# Patient Record
Sex: FEMALE | Race: WHITE
[De-identification: names, ages, dates, MRNs, and addresses within clinical notes are randomized per-mention and may not be internally consistent; named-entity substitution may affect disease eponyms.]

---

## 2021-10-13 ENCOUNTER — HOSPITAL ENCOUNTER (OUTPATIENT)
Dept: HOSPITAL 79 - EMI | Age: 66
End: 2021-10-13
Attending: NURSE PRACTITIONER
Payer: COMMERCIAL

## 2021-10-13 DIAGNOSIS — I67.9: ICD-10-CM

## 2021-10-13 DIAGNOSIS — R50.9: ICD-10-CM

## 2021-10-13 DIAGNOSIS — G43.009: Primary | ICD-10-CM

## 2023-05-09 ENCOUNTER — TELEPHONE (OUTPATIENT)
Dept: FAMILY MEDICINE CLINIC | Facility: CLINIC | Age: 68
End: 2023-05-09
Payer: MEDICARE

## 2023-05-09 NOTE — TELEPHONE ENCOUNTER
Attempted to contact patient to reschedule previous missed appointment on 5/9/23    HUB okay to reschedule appointment at patients earliest convenience.    Cookie Alicea, [employer] Rep

## 2023-05-11 ENCOUNTER — TELEPHONE (OUTPATIENT)
Dept: FAMILY MEDICINE CLINIC | Facility: CLINIC | Age: 68
End: 2023-05-11
Payer: MEDICARE

## 2023-05-11 NOTE — TELEPHONE ENCOUNTER
Attempted to contact patient to reschedule previous missed appointment on 5/11/23    HUB okay to reschedule appointment at patients earliest convenience.    Cookie Alicea, [employer] Rep

## 2023-05-17 ENCOUNTER — OFFICE VISIT (OUTPATIENT)
Dept: FAMILY MEDICINE CLINIC | Facility: CLINIC | Age: 68
End: 2023-05-17
Payer: MEDICARE

## 2023-05-17 VITALS
OXYGEN SATURATION: 96 % | TEMPERATURE: 97.1 F | BODY MASS INDEX: 20.76 KG/M2 | WEIGHT: 112.8 LBS | HEART RATE: 86 BPM | DIASTOLIC BLOOD PRESSURE: 64 MMHG | SYSTOLIC BLOOD PRESSURE: 122 MMHG | RESPIRATION RATE: 18 BRPM | HEIGHT: 62 IN

## 2023-05-17 DIAGNOSIS — Z23 NEED FOR TDAP VACCINATION: ICD-10-CM

## 2023-05-17 DIAGNOSIS — E11.65 TYPE 2 DIABETES MELLITUS WITH HYPERGLYCEMIA, WITHOUT LONG-TERM CURRENT USE OF INSULIN: ICD-10-CM

## 2023-05-17 DIAGNOSIS — E78.49 OTHER HYPERLIPIDEMIA: ICD-10-CM

## 2023-05-17 DIAGNOSIS — M81.0 AGE-RELATED OSTEOPOROSIS WITHOUT CURRENT PATHOLOGICAL FRACTURE: ICD-10-CM

## 2023-05-17 DIAGNOSIS — Z00.00 ANNUAL PHYSICAL EXAM: ICD-10-CM

## 2023-05-17 DIAGNOSIS — Z00.00 ENCOUNTER FOR MEDICAL EXAMINATION TO ESTABLISH CARE: Primary | ICD-10-CM

## 2023-05-17 DIAGNOSIS — Z87.891 PERSONAL HISTORY OF NICOTINE DEPENDENCE: ICD-10-CM

## 2023-05-17 DIAGNOSIS — J43.8 OTHER EMPHYSEMA: ICD-10-CM

## 2023-05-17 DIAGNOSIS — Z13.820 SCREENING FOR OSTEOPOROSIS: ICD-10-CM

## 2023-05-17 DIAGNOSIS — Z12.2 ENCOUNTER FOR SCREENING FOR LUNG CANCER: ICD-10-CM

## 2023-05-17 DIAGNOSIS — Z12.2 SCREENING FOR LUNG CANCER: ICD-10-CM

## 2023-05-17 DIAGNOSIS — F33.41 RECURRENT MAJOR DEPRESSIVE DISORDER, IN PARTIAL REMISSION: ICD-10-CM

## 2023-05-17 PROBLEM — I67.9 CEREBROVASCULAR SMALL VESSEL DISEASE: Status: ACTIVE | Noted: 2021-09-30

## 2023-05-17 PROBLEM — J30.89 ENVIRONMENTAL AND SEASONAL ALLERGIES: Status: ACTIVE | Noted: 2023-05-17

## 2023-05-17 PROBLEM — G43.009 MIGRAINE WITHOUT AURA AND WITHOUT STATUS MIGRAINOSUS, NOT INTRACTABLE: Status: ACTIVE | Noted: 2023-03-21

## 2023-05-17 PROBLEM — K21.9 GASTROESOPHAGEAL REFLUX DISEASE WITHOUT ESOPHAGITIS: Status: ACTIVE | Noted: 2023-05-17

## 2023-05-17 PROBLEM — M05.79 RHEUMATOID ARTHRITIS INVOLVING MULTIPLE SITES WITH POSITIVE RHEUMATOID FACTOR: Status: ACTIVE | Noted: 2023-05-17

## 2023-05-17 PROBLEM — G44.89 CHRONIC MIXED HEADACHE SYNDROME: Status: ACTIVE | Noted: 2021-09-30

## 2023-05-17 PROBLEM — F33.9 RECURRENT MAJOR DEPRESSIVE DISORDER: Status: ACTIVE | Noted: 2023-05-17

## 2023-05-17 LAB
ALBUMIN SERPL-MCNC: 4.1 G/DL (ref 3.5–5.2)
ALBUMIN/GLOB SERPL: 1.2 G/DL
ALP SERPL-CCNC: 121 U/L (ref 39–117)
ALT SERPL W P-5'-P-CCNC: 9 U/L (ref 1–33)
ANION GAP SERPL CALCULATED.3IONS-SCNC: 10.9 MMOL/L (ref 5–15)
AST SERPL-CCNC: 12 U/L (ref 1–32)
BASOPHILS # BLD AUTO: 0.13 10*3/MM3 (ref 0–0.2)
BASOPHILS NFR BLD AUTO: 1.1 % (ref 0–1.5)
BILIRUB BLD-MCNC: NEGATIVE MG/DL
BILIRUB SERPL-MCNC: 0.2 MG/DL (ref 0–1.2)
BUN SERPL-MCNC: 14 MG/DL (ref 8–23)
BUN/CREAT SERPL: 18.7 (ref 7–25)
CALCIUM SPEC-SCNC: 9.9 MG/DL (ref 8.6–10.5)
CHLORIDE SERPL-SCNC: 103 MMOL/L (ref 98–107)
CHOLEST SERPL-MCNC: 154 MG/DL (ref 0–200)
CLARITY, POC: CLEAR
CO2 SERPL-SCNC: 24.1 MMOL/L (ref 22–29)
COLOR UR: YELLOW
CREAT SERPL-MCNC: 0.75 MG/DL (ref 0.57–1)
DEPRECATED RDW RBC AUTO: 48.8 FL (ref 37–54)
EGFRCR SERPLBLD CKD-EPI 2021: 87.9 ML/MIN/1.73
EOSINOPHIL # BLD AUTO: 0.23 10*3/MM3 (ref 0–0.4)
EOSINOPHIL NFR BLD AUTO: 1.9 % (ref 0.3–6.2)
ERYTHROCYTE [DISTWIDTH] IN BLOOD BY AUTOMATED COUNT: 13.4 % (ref 12.3–15.4)
GLOBULIN UR ELPH-MCNC: 3.4 GM/DL
GLUCOSE SERPL-MCNC: 98 MG/DL (ref 65–99)
GLUCOSE UR STRIP-MCNC: NEGATIVE MG/DL
HBA1C MFR BLD: 5.9 % (ref 4.8–5.6)
HCT VFR BLD AUTO: 44.1 % (ref 34–46.6)
HCV AB SER DONR QL: NORMAL
HDLC SERPL-MCNC: 47 MG/DL (ref 40–60)
HGB BLD-MCNC: 14 G/DL (ref 12–15.9)
IMM GRANULOCYTES # BLD AUTO: 0.19 10*3/MM3 (ref 0–0.05)
IMM GRANULOCYTES NFR BLD AUTO: 1.6 % (ref 0–0.5)
KETONES UR QL: NEGATIVE
LDLC SERPL CALC-MCNC: 90 MG/DL (ref 0–100)
LDLC/HDLC SERPL: 1.88 {RATIO}
LEUKOCYTE EST, POC: NEGATIVE
LYMPHOCYTES # BLD AUTO: 1.81 10*3/MM3 (ref 0.7–3.1)
LYMPHOCYTES NFR BLD AUTO: 14.9 % (ref 19.6–45.3)
MCH RBC QN AUTO: 31.3 PG (ref 26.6–33)
MCHC RBC AUTO-ENTMCNC: 31.7 G/DL (ref 31.5–35.7)
MCV RBC AUTO: 98.7 FL (ref 79–97)
MONOCYTES # BLD AUTO: 1.22 10*3/MM3 (ref 0.1–0.9)
MONOCYTES NFR BLD AUTO: 10 % (ref 5–12)
NEUTROPHILS NFR BLD AUTO: 70.5 % (ref 42.7–76)
NEUTROPHILS NFR BLD AUTO: 8.57 10*3/MM3 (ref 1.7–7)
NITRITE UR-MCNC: NEGATIVE MG/ML
NRBC BLD AUTO-RTO: 0 /100 WBC (ref 0–0.2)
PH UR: 6 [PH] (ref 5–8)
PLATELET # BLD AUTO: 388 10*3/MM3 (ref 140–450)
PMV BLD AUTO: 10.8 FL (ref 6–12)
POTASSIUM SERPL-SCNC: 3.6 MMOL/L (ref 3.5–5.2)
PROT SERPL-MCNC: 7.5 G/DL (ref 6–8.5)
PROT UR STRIP-MCNC: NEGATIVE MG/DL
RBC # BLD AUTO: 4.47 10*6/MM3 (ref 3.77–5.28)
RBC # UR STRIP: NEGATIVE /UL
SODIUM SERPL-SCNC: 138 MMOL/L (ref 136–145)
SP GR UR: 1.01 (ref 1–1.03)
TRIGL SERPL-MCNC: 93 MG/DL (ref 0–150)
TSH SERPL DL<=0.05 MIU/L-ACNC: 0.43 UIU/ML (ref 0.27–4.2)
UROBILINOGEN UR QL: NORMAL
VLDLC SERPL-MCNC: 17 MG/DL (ref 5–40)
WBC NRBC COR # BLD: 12.15 10*3/MM3 (ref 3.4–10.8)

## 2023-05-17 PROCEDURE — 85025 COMPLETE CBC W/AUTO DIFF WBC: CPT | Performed by: PSYCHOLOGIST

## 2023-05-17 PROCEDURE — 80061 LIPID PANEL: CPT | Performed by: PSYCHOLOGIST

## 2023-05-17 PROCEDURE — 84443 ASSAY THYROID STIM HORMONE: CPT | Performed by: PSYCHOLOGIST

## 2023-05-17 PROCEDURE — 86803 HEPATITIS C AB TEST: CPT | Performed by: PSYCHOLOGIST

## 2023-05-17 PROCEDURE — 83036 HEMOGLOBIN GLYCOSYLATED A1C: CPT | Performed by: PSYCHOLOGIST

## 2023-05-17 PROCEDURE — 82306 VITAMIN D 25 HYDROXY: CPT | Performed by: PSYCHOLOGIST

## 2023-05-17 PROCEDURE — 82043 UR ALBUMIN QUANTITATIVE: CPT | Performed by: PSYCHOLOGIST

## 2023-05-17 PROCEDURE — 82607 VITAMIN B-12: CPT | Performed by: PSYCHOLOGIST

## 2023-05-17 PROCEDURE — 80053 COMPREHEN METABOLIC PANEL: CPT | Performed by: PSYCHOLOGIST

## 2023-05-17 RX ORDER — FLUTICASONE PROPIONATE 50 MCG
2 SPRAY, SUSPENSION (ML) NASAL DAILY
COMMUNITY

## 2023-05-17 RX ORDER — MULTIPLE VITAMINS W/ MINERALS TAB 9MG-400MCG
1 TAB ORAL DAILY
COMMUNITY

## 2023-05-17 RX ORDER — HYDROXYCHLOROQUINE SULFATE 200 MG/1
1 TABLET, FILM COATED ORAL DAILY
COMMUNITY
Start: 2023-05-03

## 2023-05-17 RX ORDER — RAMIPRIL 2.5 MG/1
2.5 CAPSULE ORAL DAILY
COMMUNITY
Start: 2023-05-03

## 2023-05-17 RX ORDER — BUTYROSPERMUM PARKII(SHEA BUTTER), SIMMONDSIA CHINENSIS (JOJOBA) SEED OIL, ALOE BARBADENSIS LEAF EXTRACT .01; 1; 3.5 G/100G; G/100G; G/100G
LIQUID TOPICAL
COMMUNITY

## 2023-05-17 RX ORDER — EAR PLUGS
EACH OTIC (EAR)
COMMUNITY

## 2023-05-17 RX ORDER — FAMOTIDINE 20 MG/1
1 TABLET, FILM COATED ORAL DAILY
COMMUNITY
Start: 2023-05-03

## 2023-05-17 RX ORDER — ACETAMINOPHEN 160 MG
2000 TABLET,DISINTEGRATING ORAL DAILY
COMMUNITY

## 2023-05-17 RX ORDER — SITAGLIPTIN 25 MG/1
TABLET, FILM COATED ORAL
COMMUNITY
Start: 2023-05-03

## 2023-05-17 RX ORDER — OMEPRAZOLE 20 MG/1
20 CAPSULE, DELAYED RELEASE ORAL 2 TIMES DAILY
COMMUNITY
Start: 2023-05-03

## 2023-05-17 RX ORDER — METOPROLOL SUCCINATE 25 MG/1
1 TABLET, EXTENDED RELEASE ORAL DAILY
COMMUNITY
Start: 2023-05-03

## 2023-05-17 RX ORDER — ALBUTEROL SULFATE 90 UG/1
AEROSOL, METERED RESPIRATORY (INHALATION)
COMMUNITY
Start: 2023-02-06

## 2023-05-17 RX ORDER — VENLAFAXINE HYDROCHLORIDE 150 MG/1
CAPSULE, EXTENDED RELEASE ORAL
COMMUNITY
Start: 2023-05-03

## 2023-05-17 RX ORDER — OXCARBAZEPINE 150 MG/1
1 TABLET, FILM COATED ORAL EVERY 12 HOURS SCHEDULED
COMMUNITY
Start: 2023-05-03

## 2023-05-17 RX ORDER — TOPIRAMATE 50 MG/1
TABLET, FILM COATED ORAL
COMMUNITY
Start: 2023-05-03

## 2023-05-17 RX ORDER — PRAVASTATIN SODIUM 20 MG
TABLET ORAL
COMMUNITY
Start: 2023-05-03

## 2023-05-17 RX ORDER — CLONIDINE HYDROCHLORIDE 0.1 MG/1
0.1 TABLET ORAL 2 TIMES DAILY
COMMUNITY

## 2023-05-17 RX ORDER — FUROSEMIDE 20 MG/1
1 TABLET ORAL DAILY
COMMUNITY
Start: 2023-05-03

## 2023-05-17 RX ORDER — TICAGRELOR 90 MG/1
1 TABLET ORAL EVERY 12 HOURS SCHEDULED
COMMUNITY
Start: 2023-05-03

## 2023-05-17 RX ORDER — ISOSORBIDE DINITRATE 10 MG/1
1 TABLET ORAL EVERY 12 HOURS SCHEDULED
COMMUNITY
Start: 2023-05-03

## 2023-05-17 RX ORDER — MONTELUKAST SODIUM 10 MG/1
1 TABLET ORAL DAILY
COMMUNITY
Start: 2023-05-03

## 2023-05-17 NOTE — PROGRESS NOTES
Chief Complaint  Establish Care and Migraine    Subjective        Radha Stanton presents to [facility]   C/O ESTABLISH CARE AS HER PCP 2. ANNUAL PHYSICAL 3. CHRONIC ILLNESS  4. FASTING LABS [address]79  Hyperlipidemia  This is a chronic problem. The current episode started more than 1 year ago. Factors aggravating her hyperlipidemia include smoking. Pertinent negatives include no chest pain or shortness of breath. Current antihyperlipidemic treatment includes statins. There are no compliance problems.  Risk factors for coronary artery disease include hypertension, diabetes mellitus and post-menopausal.   COPD  There is no chest tightness, difficulty breathing, shortness of breath or wheezing. This is a chronic problem. The problem has been waxing and waning. Pertinent negatives include no chest pain. Her symptoms are aggravated by change in weather and pollen. Her symptoms are alleviated by ipratropium and beta-agonist. She reports moderate improvement on treatment. Risk factors for lung disease include smoking/tobacco exposure. Her past medical history is significant for COPD and emphysema.   Diabetes  She presents for her initial diabetic visit. She has type 2 diabetes mellitus. No MedicAlert identification noted. Pertinent negatives for diabetes include no chest pain. Risk factors for coronary artery disease include hypertension and dyslipidemia. Current diabetic treatment includes diet and oral agent (monotherapy). She is compliant with treatment all of the time. Her weight is stable. She is following a generally healthy diet. She has not had a previous visit with a dietitian. She participates in exercise daily. An ACE inhibitor/angiotensin II receptor blocker is being taken. She does not see a podiatrist.Eye exam is not current.       Objective   Vital Signs:  /64 (BP Location: Left arm, Patient Position: Sitting, Cuff Size: Adult)   Pulse 86   Temp 97.1 °F (36.2 °C) (Temporal)   " Resp 18   Ht 157.5 cm (62\")   Wt 51.2 kg (112 lb 12.8 oz)   SpO2 96%   BMI 20.63 kg/m²   Estimated body mass index is 20.63 kg/m² as calculated from the following:    Height as of this encounter: 157.5 cm (62\").    Weight as of this encounter: 51.2 kg (112 lb 12.8 oz).    BMI is within normal parameters. No other follow-up for BMI required.      Physical Exam  Vitals and nursing note reviewed. Exam conducted with a chaperone present.   Constitutional:       General: She is awake.      Appearance: Normal appearance. She is well-developed, well-groomed and normal weight.   HENT:      Head: Normocephalic and atraumatic.      Jaw: There is normal jaw occlusion.      Right Ear: Hearing, tympanic membrane, ear canal and external ear normal.      Left Ear: Hearing, tympanic membrane, ear canal and external ear normal.      Nose: Nose normal.      Mouth/Throat:      Lips: Pink.      Mouth: Mucous membranes are moist.      Pharynx: Oropharynx is clear.   Eyes:      General: Lids are normal. Vision grossly intact. Gaze aligned appropriately.      Extraocular Movements: Extraocular movements intact.      Conjunctiva/sclera: Conjunctivae normal.      Pupils: Pupils are equal, round, and reactive to light.   Neck:      Trachea: Trachea and phonation normal.   Cardiovascular:      Rate and Rhythm: Normal rate and regular rhythm.      Pulses: Normal pulses.      Heart sounds: Normal heart sounds.   Pulmonary:      Effort: Pulmonary effort is normal.      Breath sounds: Normal breath sounds and air entry.   Abdominal:      General: Abdomen is flat. Bowel sounds are normal.      Palpations: Abdomen is soft.   Genitourinary:     Rectum: Normal.   Musculoskeletal:         General: Normal range of motion.      Right shoulder: Normal.      Left shoulder: Normal.      Right upper arm: Normal.      Left upper arm: Normal.      Right elbow: Normal.      Left elbow: Normal.      Right forearm: Normal.      Left forearm: Normal.      " Right wrist: Normal.      Left wrist: Normal.      Right hand: Normal.      Left hand: Normal.      Cervical back: Normal, full passive range of motion without pain, normal range of motion and neck supple.      Thoracic back: Normal.      Lumbar back: Normal.      Right hip: Normal.      Left hip: Normal.      Right upper leg: Normal.      Left upper leg: Normal.      Right knee: Normal.      Left knee: Normal.      Right lower leg: Normal. No edema.      Left lower leg: Normal. No edema.      Right ankle: Normal.      Right Achilles Tendon: Normal.      Left ankle: Normal.      Left Achilles Tendon: Normal.      Right foot: Normal.      Left foot: Normal.   Lymphadenopathy:      Cervical: No cervical adenopathy.   Skin:     General: Skin is warm.      Capillary Refill: Capillary refill takes less than 2 seconds.   Neurological:      General: No focal deficit present.      Mental Status: She is alert and oriented to person, place, and time.      Cranial Nerves: Cranial nerves 2-12 are intact.      Sensory: Sensation is intact.      Motor: Motor function is intact.      Coordination: Coordination is intact.      Gait: Gait is intact.      Deep Tendon Reflexes: Reflexes are normal and symmetric.   Psychiatric:         Attention and Perception: Attention and perception normal.         Mood and Affect: Mood and affect normal.         Speech: Speech normal.         Behavior: Behavior normal. Behavior is cooperative.         Thought Content: Thought content normal.         Cognition and Memory: Cognition and memory normal.         Judgment: Judgment normal.        Result Review :  The following data was reviewed by: Johnathan Carey MD on 05/17/2023:  LABS STILL PENDING . WILL REVIEW FROM PREVIOUS PCP           Assessment and Plan   Diagnoses and all orders for this visit:    1. Encounter for medical examination to establish care (Primary)    2. Annual physical exam  -     CBC & Differential  -     Comprehensive  Metabolic Panel  -     Hemoglobin A1c  -     Lipid Panel  -     TSH  -     Vitamin B12  -     Vitamin D,25-Hydroxy  -     POC Urinalysis Dipstick; Future  -     Hepatitis C Antibody    3. Need for Tdap vaccination    4. Screening for lung cancer    5. Encounter for screening for lung cancer  -     CT Chest Low Dose Wo; Future    6. Other hyperlipidemia  Assessment & Plan:  Lipid abnormalities are improving with treatment.  Nutritional counseling was provided. and Pharmacotherapy as ordered.  Lipids will be reassessed in 3 months.    Orders:  -     CBC & Differential  -     Comprehensive Metabolic Panel  -     Hemoglobin A1c  -     Lipid Panel  -     TSH  -     Vitamin B12  -     Vitamin D,25-Hydroxy  -     POC Urinalysis Dipstick; Future  -     Hepatitis C Antibody    7. Type 2 diabetes mellitus with hyperglycemia, without long-term current use of insulin  Assessment & Plan:  Diabetes is newly identified.   Continue current treatment regimen.  Regular aerobic exercise.  Discussed sick day management.  Discussed foot care.  Reminded to get yearly retinal exam.  Diabetes will be reassessed in 3 months.    NEW PATIENT AS HER PCP -- WILL CK A1C- NOT SURE HOW CONTROLLED HER DM IS     Orders:  -     CBC & Differential  -     Comprehensive Metabolic Panel  -     Hemoglobin A1c  -     Lipid Panel  -     TSH  -     Vitamin B12  -     Vitamin D,25-Hydroxy  -     POC Urinalysis Dipstick; Future  -     Hepatitis C Antibody  -     MicroAlbumin, Urine, Random - Urine, Clean Catch    8. Recurrent major depressive disorder, in partial remission  Assessment & Plan:  Patient's depression is recurrent and is mild without psychosis. Their depression is currently in partial remission and the condition is improving with treatment. This will be reassessed in 3 months. F/U as described:patient will continue current medication therapy.  SHE IS UNDER THE CARE OF AARON    Orders:  -     CBC & Differential  -     Comprehensive Metabolic  Panel  -     Hemoglobin A1c  -     Lipid Panel  -     TSH  -     Vitamin B12  -     Vitamin D,25-Hydroxy  -     POC Urinalysis Dipstick; Future  -     Hepatitis C Antibody    9. Other emphysema  Assessment & Plan:  COPD is worsening.  Warning signs of respiratory distress were reviewed with the patient.   Continue current medications.    SHE IS UNDER DR ROSARIO'S CARE AND RECENTLY DX WITH PNEUMONIA       Orders:  -     CBC & Differential  -     Comprehensive Metabolic Panel  -     Hemoglobin A1c  -     Lipid Panel  -     TSH  -     Vitamin B12  -     Vitamin D,25-Hydroxy  -     POC Urinalysis Dipstick; Future  -     Hepatitis C Antibody    10. Screening for osteoporosis  -     DEXA Bone Density Axial; Future    11. Age-related osteoporosis without current pathological fracture  -     DEXA Bone Density Axial; Future  -     Vitamin D,25-Hydroxy    12. Personal history of nicotine dependence  -     CT Chest Low Dose Wo; Future    Other orders  -     Tdap Vaccine Greater Than or Equal To 6yo IM    I spent 35 minutes caring for Radha on this date of service. This time includes time spent by me in the following activities:reviewing tests, performing a medically appropriate examination and/or evaluation , counseling and educating the patient/family/caregiver, ordering medications, tests, or procedures and documenting information in the medical record     The preventive exam has been reviewed in detail.  The patient has been fully counseled on preventative guidelines for vaccines, cancer screenings, and other health maintenance needs.   The patient has been counseled on guidelines for maintaining a lifestyle to promote good health and to minimize chronic diseases.  The patient has been assisted with scheduling these healthcare procedures for the coming year and given a written document of health maintenance and anticipatory guidance for age with the AVS.   -  Follow Up   Return in about 3 months (around 8/17/2023) for Recheck,  RTC FASTING LABS ( CHRONIC ILLNES/ MED REFILL) .  Patient was given instructions and counseling regarding her condition or for health maintenance advice. Please see specific information pulled into the AVS if appropriate.           This document has been electronically signed by Johnathan Carey MD  May 18, 2023 08:48 EDT

## 2023-05-17 NOTE — ASSESSMENT & PLAN NOTE
COPD is worsening.  Warning signs of respiratory distress were reviewed with the patient.   Continue current medications.    SHE IS UNDER DR ROSARIO'S CARE AND RECENTLY DX WITH PNEUMONIA

## 2023-05-17 NOTE — ASSESSMENT & PLAN NOTE
Diabetes is newly identified.   Continue current treatment regimen.  Regular aerobic exercise.  Discussed sick day management.  Discussed foot care.  Reminded to get yearly retinal exam.  Diabetes will be reassessed in 3 months.    NEW PATIENT AS HER PCP -- WILL CK A1C- NOT SURE HOW CONTROLLED HER DM IS

## 2023-05-17 NOTE — ASSESSMENT & PLAN NOTE
Patient's depression is recurrent and is mild without psychosis. Their depression is currently in partial remission and the condition is improving with treatment. This will be reassessed in 3 months. F/U as described:patient will continue current medication therapy.  SHE IS UNDER THE CARE OF AARON

## 2023-05-18 LAB
25(OH)D3 SERPL-MCNC: 45.3 NG/ML (ref 30–100)
ALBUMIN UR-MCNC: <1.2 MG/DL
VIT B12 BLD-MCNC: 1206 PG/ML (ref 211–946)

## 2023-05-19 ENCOUNTER — PATIENT ROUNDING (BHMG ONLY) (OUTPATIENT)
Dept: FAMILY MEDICINE CLINIC | Facility: CLINIC | Age: 68
End: 2023-05-19
Payer: MEDICARE

## 2023-05-19 NOTE — PROGRESS NOTES
"May 19, 2023    Hello, may I speak with Radha Stanton?    My name is Eliezer Forrest     I am  with [facility]  [address] [address] [phone].    Before we get started may I verify your date of birth? 1955    I am calling to officially welcome you to our practice and ask about your recent visit. Is this a good time to talk?     YES    Tell me about your visit with us. What things went well?      Patient said that she was referred to our office by some family members. She said she has been looking for a \"real\" doctor and she feels that is what Dr. Carey is. She said the staff was very nice and she loves her new doctor (Dr. Mcpherson).       We're always looking for ways to make our patients' experiences even better. Do you have recommendations on ways we may improve?      NO    Overall were you satisfied with your first visit to our practice?     YES     I appreciate you taking the time to speak with me today. Is there anything else I can do for you?     NO      Thank you, and have a great day.      " None

## 2023-05-22 ENCOUNTER — TELEPHONE (OUTPATIENT)
Dept: FAMILY MEDICINE CLINIC | Facility: CLINIC | Age: 68
End: 2023-05-22
Payer: MEDICARE

## 2023-05-22 NOTE — TELEPHONE ENCOUNTER
Caller: Radha Stanton    Relationship to patient: Self    Best call back number: [phone]    Chief complaint: FOLLOWUP    Type of visit: FOLLOWUP     Additional notes:  PATIENT SAW DR SNELL ON 5/17/23 AND WANTED TO KNOW WHEN SHE NEEDS TO SEE HER BACK.  PLEASE ADVISE.

## 2023-05-23 NOTE — TELEPHONE ENCOUNTER
Attempted to reach patient to schedule follow up for -      Return in about 3 months (around 8/17/2023) for Recheck, RTC FASTING LABS ( CHRONIC ILLNES/ MED REFILL) .    HUB okay to schedule patient for on of after 8/17/23

## 2023-08-08 ENCOUNTER — TELEPHONE (OUTPATIENT)
Dept: FAMILY MEDICINE CLINIC | Facility: CLINIC | Age: 68
End: 2023-08-08
Payer: MEDICARE

## 2023-08-08 NOTE — TELEPHONE ENCOUNTER
Attempted to reach patient to schedule appointment on or after 8/17/2023 for an ov to Recheck, RTC FASTING LABS ( CHRONIC ILLNES/ MED REFILL) .   HUB okay to schedule

## 2023-08-09 ENCOUNTER — TELEPHONE (OUTPATIENT)
Dept: FAMILY MEDICINE CLINIC | Facility: CLINIC | Age: 68
End: 2023-08-09
Payer: MEDICARE

## 2023-08-09 NOTE — TELEPHONE ENCOUNTER
Called patient to advise it was time for her yearly mammogram.  Did patient answer? No  If yes, did patient wish to have mammogram scheduled at this time? N/A

## 2023-08-10 ENCOUNTER — TELEPHONE (OUTPATIENT)
Dept: FAMILY MEDICINE CLINIC | Facility: CLINIC | Age: 68
End: 2023-08-10
Payer: MEDICARE

## 2024-01-08 ENCOUNTER — OFFICE VISIT (OUTPATIENT)
Dept: FAMILY MEDICINE CLINIC | Facility: CLINIC | Age: 69
End: 2024-01-08
Payer: MEDICARE

## 2024-01-08 VITALS
DIASTOLIC BLOOD PRESSURE: 72 MMHG | TEMPERATURE: 97.6 F | HEART RATE: 88 BPM | HEIGHT: 62 IN | BODY MASS INDEX: 19.69 KG/M2 | SYSTOLIC BLOOD PRESSURE: 98 MMHG | WEIGHT: 107 LBS | RESPIRATION RATE: 18 BRPM | OXYGEN SATURATION: 99 %

## 2024-01-08 DIAGNOSIS — Z87.891 PERSONAL HISTORY OF NICOTINE DEPENDENCE: ICD-10-CM

## 2024-01-08 DIAGNOSIS — Z12.31 ENCOUNTER FOR SCREENING MAMMOGRAM FOR MALIGNANT NEOPLASM OF BREAST: ICD-10-CM

## 2024-01-08 DIAGNOSIS — M81.0 AGE-RELATED OSTEOPOROSIS WITHOUT CURRENT PATHOLOGICAL FRACTURE: ICD-10-CM

## 2024-01-08 DIAGNOSIS — J01.00 ACUTE MAXILLARY SINUSITIS, RECURRENCE NOT SPECIFIED: ICD-10-CM

## 2024-01-08 DIAGNOSIS — Z79.899 MEDICATION MANAGEMENT: ICD-10-CM

## 2024-01-08 DIAGNOSIS — Z00.00 ENCOUNTER FOR INITIAL ANNUAL WELLNESS VISIT (AWV) IN MEDICARE PATIENT: Primary | ICD-10-CM

## 2024-01-08 DIAGNOSIS — Z76.0 ENCOUNTER FOR MEDICATION REFILL: ICD-10-CM

## 2024-01-08 DIAGNOSIS — R91.8 MULTIPLE PULMONARY NODULES: ICD-10-CM

## 2024-01-08 PROBLEM — M54.81 BILATERAL OCCIPITAL NEURALGIA: Status: ACTIVE | Noted: 2020-03-10

## 2024-01-08 PROBLEM — G43.719 INTRACTABLE CHRONIC MIGRAINE WITHOUT AURA AND WITHOUT STATUS MIGRAINOSUS: Status: ACTIVE | Noted: 2020-03-10

## 2024-01-08 LAB
ALBUMIN SERPL-MCNC: 4 G/DL (ref 3.5–5.2)
ALBUMIN/GLOB SERPL: 1.4 G/DL
ALP SERPL-CCNC: 94 U/L (ref 39–117)
ALT SERPL W P-5'-P-CCNC: 11 U/L (ref 1–33)
ANION GAP SERPL CALCULATED.3IONS-SCNC: 8.3 MMOL/L (ref 5–15)
AST SERPL-CCNC: 14 U/L (ref 1–32)
BASOPHILS # BLD AUTO: 0.08 10*3/MM3 (ref 0–0.2)
BASOPHILS NFR BLD AUTO: 0.9 % (ref 0–1.5)
BILIRUB BLD-MCNC: NEGATIVE MG/DL
BILIRUB SERPL-MCNC: <0.2 MG/DL (ref 0–1.2)
BUN SERPL-MCNC: 12 MG/DL (ref 8–23)
BUN/CREAT SERPL: 14.5 (ref 7–25)
CALCIUM SPEC-SCNC: 10.1 MG/DL (ref 8.6–10.5)
CHLORIDE SERPL-SCNC: 105 MMOL/L (ref 98–107)
CHOLEST SERPL-MCNC: 175 MG/DL (ref 0–200)
CLARITY, POC: CLEAR
CO2 SERPL-SCNC: 28.7 MMOL/L (ref 22–29)
COLOR UR: YELLOW
CREAT SERPL-MCNC: 0.83 MG/DL (ref 0.57–1)
DEPRECATED RDW RBC AUTO: 55.3 FL (ref 37–54)
EGFRCR SERPLBLD CKD-EPI 2021: 77.9 ML/MIN/1.73
EOSINOPHIL # BLD AUTO: 0.14 10*3/MM3 (ref 0–0.4)
EOSINOPHIL NFR BLD AUTO: 1.5 % (ref 0.3–6.2)
ERYTHROCYTE [DISTWIDTH] IN BLOOD BY AUTOMATED COUNT: 14.8 % (ref 12.3–15.4)
GLOBULIN UR ELPH-MCNC: 2.8 GM/DL
GLUCOSE SERPL-MCNC: 102 MG/DL (ref 65–99)
GLUCOSE UR STRIP-MCNC: NEGATIVE MG/DL
HBA1C MFR BLD: 5.6 % (ref 4.8–5.6)
HCT VFR BLD AUTO: 39 % (ref 34–46.6)
HDLC SERPL-MCNC: 70 MG/DL (ref 40–60)
HGB BLD-MCNC: 12.5 G/DL (ref 12–15.9)
IMM GRANULOCYTES # BLD AUTO: 0.07 10*3/MM3 (ref 0–0.05)
IMM GRANULOCYTES NFR BLD AUTO: 0.7 % (ref 0–0.5)
KETONES UR QL: NEGATIVE
LDLC SERPL CALC-MCNC: 91 MG/DL (ref 0–100)
LDLC/HDLC SERPL: 1.28 {RATIO}
LEUKOCYTE EST, POC: NEGATIVE
LYMPHOCYTES # BLD AUTO: 1.74 10*3/MM3 (ref 0.7–3.1)
LYMPHOCYTES NFR BLD AUTO: 18.6 % (ref 19.6–45.3)
MCH RBC QN AUTO: 31.7 PG (ref 26.6–33)
MCHC RBC AUTO-ENTMCNC: 32.1 G/DL (ref 31.5–35.7)
MCV RBC AUTO: 99 FL (ref 79–97)
MONOCYTES # BLD AUTO: 0.98 10*3/MM3 (ref 0.1–0.9)
MONOCYTES NFR BLD AUTO: 10.5 % (ref 5–12)
NEUTROPHILS NFR BLD AUTO: 6.33 10*3/MM3 (ref 1.7–7)
NEUTROPHILS NFR BLD AUTO: 67.8 % (ref 42.7–76)
NITRITE UR-MCNC: NEGATIVE MG/ML
NRBC BLD AUTO-RTO: 0 /100 WBC (ref 0–0.2)
PH UR: 7 [PH] (ref 5–8)
PLATELET # BLD AUTO: 259 10*3/MM3 (ref 140–450)
PMV BLD AUTO: 11 FL (ref 6–12)
POTASSIUM SERPL-SCNC: 4.7 MMOL/L (ref 3.5–5.2)
PROT SERPL-MCNC: 6.8 G/DL (ref 6–8.5)
PROT UR STRIP-MCNC: NEGATIVE MG/DL
RBC # BLD AUTO: 3.94 10*6/MM3 (ref 3.77–5.28)
RBC # UR STRIP: NEGATIVE /UL
SODIUM SERPL-SCNC: 142 MMOL/L (ref 136–145)
SP GR UR: 1.01 (ref 1–1.03)
TRIGL SERPL-MCNC: 78 MG/DL (ref 0–150)
UROBILINOGEN UR QL: NORMAL
VLDLC SERPL-MCNC: 14 MG/DL (ref 5–40)
WBC NRBC COR # BLD AUTO: 9.34 10*3/MM3 (ref 3.4–10.8)

## 2024-01-08 PROCEDURE — 82607 VITAMIN B-12: CPT | Performed by: PSYCHOLOGIST

## 2024-01-08 PROCEDURE — 85025 COMPLETE CBC W/AUTO DIFF WBC: CPT | Performed by: PSYCHOLOGIST

## 2024-01-08 PROCEDURE — 80061 LIPID PANEL: CPT | Performed by: PSYCHOLOGIST

## 2024-01-08 PROCEDURE — 80053 COMPREHEN METABOLIC PANEL: CPT | Performed by: PSYCHOLOGIST

## 2024-01-08 PROCEDURE — 83036 HEMOGLOBIN GLYCOSYLATED A1C: CPT | Performed by: PSYCHOLOGIST

## 2024-01-08 PROCEDURE — 82306 VITAMIN D 25 HYDROXY: CPT | Performed by: PSYCHOLOGIST

## 2024-01-08 RX ORDER — ESOMEPRAZOLE MAGNESIUM 40 MG/1
40 CAPSULE, DELAYED RELEASE ORAL
COMMUNITY

## 2024-01-08 RX ORDER — LORAZEPAM 0.5 MG/1
1 TABLET ORAL 2 TIMES DAILY PRN
COMMUNITY
Start: 2023-11-29 | End: 2024-01-08

## 2024-01-08 RX ORDER — DICYCLOMINE HCL 20 MG
20 TABLET ORAL 2 TIMES DAILY
COMMUNITY

## 2024-01-08 RX ORDER — FLUTICASONE PROPIONATE 50 MCG
2 SPRAY, SUSPENSION (ML) NASAL DAILY
Qty: 18.2 ML | Refills: 0 | Status: SHIPPED | OUTPATIENT
Start: 2024-01-08 | End: 2024-02-07

## 2024-01-08 RX ORDER — AZITHROMYCIN 250 MG/1
TABLET, FILM COATED ORAL
Qty: 11 TABLET | Refills: 0 | Status: SHIPPED | OUTPATIENT
Start: 2024-01-08

## 2024-01-08 RX ORDER — IBUPROFEN 600 MG/1
600 TABLET ORAL EVERY 6 HOURS PRN
COMMUNITY

## 2024-01-08 RX ORDER — DEXAMETHASONE SODIUM PHOSPHATE 4 MG/ML
8 INJECTION, SOLUTION INTRA-ARTICULAR; INTRALESIONAL; INTRAMUSCULAR; INTRAVENOUS; SOFT TISSUE ONCE
Status: COMPLETED | OUTPATIENT
Start: 2024-01-08 | End: 2024-01-08

## 2024-01-08 RX ADMIN — DEXAMETHASONE SODIUM PHOSPHATE 8 MG: 4 INJECTION, SOLUTION INTRA-ARTICULAR; INTRALESIONAL; INTRAMUSCULAR; INTRAVENOUS; SOFT TISSUE at 12:02

## 2024-01-08 NOTE — PROGRESS NOTES
The ABCs of the Annual Wellness Visit  Initial Medicare Wellness Visit    Subjective     Radha Stanton is a 68 y.o. female who presents for an Initial Medicare Wellness Visit.    The following portions of the patient's history were reviewed and   updated as appropriate: allergies, current medications, past family history, past medical history, past social history, past surgical history, and problem list.     Compared to one year ago, the patient feels her physical   health is better.    Compared to one year ago, the patient feels her mental   health is the same.    Recent Hospitalizations:  She was not admitted to the hospital during the last year.       Current Medical Providers:  Patient Care Team:  Johnathan Carey MD as PCP - General (Urgent Care)    Outpatient Medications Prior to Visit   Medication Sig Dispense Refill    albuterol sulfate  (90 Base) MCG/ACT inhaler SMARTSIG:Via Inhaler      dicyclomine (BENTYL) 20 MG tablet Take 1 tablet by mouth 2 (Two) Times a Day. For IBS      esomeprazole (nexIUM) 40 MG capsule Take 1 capsule by mouth Every Morning Before Breakfast.      furosemide (LASIX) 20 MG tablet Take 1 tablet by mouth Daily.      hydroxychloroquine (PLAQUENIL) 200 MG tablet Take 1 tablet by mouth Daily.      isosorbide dinitrate (ISORDIL) 10 MG tablet Take 1 tablet by mouth Every 12 (Twelve) Hours.      Januvia 25 MG tablet TAKE 1 TABLET EVERY DAY BY ORAL ROUTE FOR 90 DAYS.      Magnesium Citrate 100 MG capsule Take  by mouth.      metoprolol succinate XL (TOPROL-XL) 25 MG 24 hr tablet Take 1 tablet by mouth Daily.      montelukast (SINGULAIR) 10 MG tablet Take 1 tablet by mouth Daily.      multivitamin with minerals (MULTIVITAL-M PO) Take 1 tablet by mouth Daily.      OXcarbazepine (TRILEPTAL) 150 MG tablet Take 1 tablet by mouth Every 12 (Twelve) Hours.      pravastatin (PRAVACHOL) 20 MG tablet TAKE 1 TABLET EVERY DAY BY ORAL ROUTE FOR 90 DAYS.      ramipril (ALTACE) 2.5 MG capsule  Take 1 capsule by mouth Daily.      topiramate (TOPAMAX) 50 MG tablet TAKE 1 TABLET IN THE MORNING AND TAKE TAKE TABLET AT BEDTIME DAILY      venlafaxine XR (EFFEXOR-XR) 150 MG 24 hr capsule TAKE 1 CAPSULE BY MOUTH TWICE A DAY TAKE 1 CAPSULE BY MOUTH TWICE A DAY      Vitamin B-2 (RIBOFLAVIN) 100 MG tablet tablet Take 4 tablets by mouth Daily.      famotidine (PEPCID) 20 MG tablet Take 1 tablet by mouth Daily.      fluticasone (FLONASE) 50 MCG/ACT nasal spray 2 sprays into the nostril(s) as directed by provider Daily.      LORazepam (ATIVAN) 0.5 MG tablet Take 2 tablets by mouth 2 (Two) Times a Day As Needed (Panic).      Cholecalciferol (Vitamin D3) 50 MCG (2000 UT) capsule Take 1 capsule by mouth Daily.      cloNIDine (CATAPRES) 0.1 MG tablet Take 1 tablet by mouth 2 (Two) Times a Day.      ibuprofen (ADVIL,MOTRIN) 600 MG tablet Take 1 tablet by mouth Every 6 (Six) Hours As Needed for Moderate Pain.      Brilinta 90 MG tablet tablet Take 1 tablet by mouth Every 12 (Twelve) Hours. Pt states she is stopping this medication after this month      Cyanocobalamin (Vitamin B-12) 1000 MCG/15ML liquid Take  by mouth.      omeprazole (priLOSEC) 20 MG capsule Take 1 capsule by mouth 2 (Two) Times a Day.       No facility-administered medications prior to visit.       No opioid medication identified on active medication list. I have reviewed chart for other potential  high risk medication/s and harmful drug interactions in the elderly.        Aspirin is not on active medication list.     Patient Active Problem List   Diagnosis    Cerebrovascular small vessel disease    Chronic mixed headache syndrome    Migraine without aura and without status migrainosus, not intractable    Recurrent major depressive disorder    Other hyperlipidemia    Gastroesophageal reflux disease without esophagitis    Environmental and seasonal allergies    Type 2 diabetes mellitus with hyperglycemia    Rheumatoid arthritis involving multiple sites with  "positive rheumatoid factor    Other emphysema    Intractable chronic migraine without aura and without status migrainosus    HTN (hypertension)    Injury of cervical nerve roots    Insomnia    Leukocytosis    Major depressive disorder, recurrent episode, moderate    Neck pain    Osteoporosis    Paraesophageal hernia    Primary osteoarthritis of first carpometacarpal joint of left hand    Pulmonary nodules    Smoker    Ulnar nerve abnormality    Vitamin D deficiency    Allergic urticaria    Benign essential hypertension    Bilateral occipital neuralgia    Bipolar 1 disorder, depressed    Brachial neuritis or radiculitis    Carpal tunnel syndrome of left wrist    Chronic low back pain    CKD (chronic kidney disease) stage 2, GFR 60-89 ml/min    Coronary atherosclerosis of native coronary artery    Depressive disorder    History of repair of hiatal hernia     Advance Care Planning   Advance Care Planning     Advance Directive is on file.  ACP discussion was held with the patient during this visit. She will get us a copy to keep on file ans has assigned Isaiah Spangler, her son.       Objective    Vitals:    01/08/24 1028   BP: 98/72   Pulse: 88   Resp: 18   Temp: 97.6 °F (36.4 °C)   TempSrc: Temporal   SpO2: 99%   Weight: 48.5 kg (107 lb)   Height: 157.5 cm (62\")     Estimated body mass index is 19.57 kg/m² as calculated from the following:    Height as of this encounter: 157.5 cm (62\").    Weight as of this encounter: 48.5 kg (107 lb).    BMI is within normal parameters. No other follow-up for BMI required.      Does the patient have evidence of cognitive impairment?   MINI MENTAL STATUS EXAM ACE II SCORE: 30  No    Lab Results   Component Value Date    TRIG 78 01/08/2024    HDL 70 (H) 01/08/2024    LDL 91 01/08/2024    VLDL 14 01/08/2024    HGBA1C 5.60 01/08/2024        HEALTH RISK ASSESSMENT    Smoking Status:  Social History     Tobacco Use   Smoking Status Every Day    Packs/day: 0.25    Years: 14.00    Additional " pack years: 0.00    Total pack years: 3.50    Types: Cigarettes    Start date: 2014   Smokeless Tobacco Never   Tobacco Comments    In  I started smoking again I had quit for 9 years..     Alcohol Consumption:  Social History     Substance and Sexual Activity   Alcohol Use Never     Fall Risk Screen:    KATINA Fall Risk Assessment was completed, and patient is at LOW risk for falls.Assessment completed on:2024    Depression Screen:       2024    10:21 AM   PHQ-2/PHQ-9 Depression Screening   Little Interest or Pleasure in Doing Things 0-->not at all   Feeling Down, Depressed or Hopeless 0-->not at all   PHQ-9: Brief Depression Severity Measure Score 0       Health Habits and Functional and Cognitive Screenin/8/2024    10:21 AM   Functional & Cognitive Status   Do you have difficulty preparing food and eating? No   Do you have difficulty bathing yourself, getting dressed or grooming yourself? No   Do you have difficulty using the toilet? No   Do you have difficulty moving around from place to place? No   Do you have trouble with steps or getting out of a bed or a chair? No   Current Diet Frequent Junk Food   Dental Exam Other        Dental Exam Comment Appointment scheduled.   Eye Exam Other        Eye Exam Comment Appointment Rescheduled.   Exercise (times per week) 0 times per week   Current Exercises Include No Regular Exercise   Do you need help using the phone?  No   Are you deaf or do you have serious difficulty hearing?  No   Do you need help to go to places out of walking distance? No   Do you need help shopping? No   Do you need help preparing meals?  No   Do you need help with housework?  No   Do you need help with laundry? No   Do you need help taking your medications? No   Do you need help managing money? No   Do you ever drive or ride in a car without wearing a seat belt? No   Have you felt unusual stress, anger or loneliness in the last month? No   Who do you live with? Alone    If you need help, do you have trouble finding someone available to you? No   Have you been bothered in the last four weeks by sexual problems? No   Do you have difficulty concentrating, remembering or making decisions? No       Age-appropriate Screening Schedule:  Refer to the list below for future screening recommendations based on patient's age, sex and/or medical conditions. Orders for these recommended tests are listed in the plan section. The patient has been provided with a written plan.    Health Maintenance   Topic Date Due    DXA SCAN  09/05/2021    MAMMOGRAM  01/22/2024 (Originally 10/18/2020)    INFLUENZA VACCINE  03/31/2024 (Originally 8/1/2023)    DIABETIC FOOT EXAM  05/27/2024 (Originally 5/9/2023)    URINE MICROALBUMIN  05/17/2024    DIABETIC EYE EXAM  05/31/2024    HEMOGLOBIN A1C  07/08/2024    ANNUAL WELLNESS VISIT  01/08/2025    LIPID PANEL  01/08/2025    Pneumococcal Vaccine 65+ (3 of 3 - PPSV23 or PCV20) 11/17/2025    COLORECTAL CANCER SCREENING  05/17/2026    TDAP/TD VACCINES (5 - Td or Tdap) 05/17/2033    HEPATITIS C SCREENING  Completed    ZOSTER VACCINE  Completed    COVID-19 Vaccine  Discontinued          CMS Preventative Services Quick Reference  Risk Factors Identified During Encounter    Chronic Pain: Natural history and expected course discussed. Questions answered.  Fall Risk-High or Moderate: Discussed Fall Prevention in the home  Glaucoma or Family History of Glaucoma:  Ophthalmology Appointment Recommended  Hearing Problem:  hearing screening test recommnded yearly  Immunizations Discussed/Encouraged: Tdap, Hepatitis A Vaccine/Series, Hepatitis B Vaccine/Series, Influenza, Pneumococcal 23, Prevnar 20 (Pneumococcal 20-valent conjugate), Vaxneuvance (Pneumococcal 15-valent conjugate), Shingrix, COVID19, and RSV (Respiratory Syncytial Virus)  Polypharmacy: Medication List reviewed  Tobacco Use/Dependance Risk (use dotphrase .tobaccocessation for documentation)  Dental Screening  Recommended  Vision Screening Recommended    The above risks/problems have been discussed with the patient.  Pertinent information has been shared with the patient in the After Visit Summary.  An After Visit Summary and PPPS were made available to the patient.  Diagnoses and all orders for this visit:    1. Encounter for initial annual wellness visit (AWV) in Medicare patient (Primary)  -     CBC & Differential  -     Comprehensive Metabolic Panel  -     Hemoglobin A1c  -     Lipid Panel  -     Vitamin D,25-Hydroxy  -     Vitamin B12  -     POC Urinalysis Dipstick    2. Encounter for screening mammogram for malignant neoplasm of breast  -     Mammo Screening Digital Tomosynthesis Bilateral With CAD; Future    3. Multiple pulmonary nodules  -     CT Chest Low Dose Wo; Future    4. Medication management    5. Encounter for medication refill    6. Acute maxillary sinusitis, recurrence not specified  -     dexAMETHasone (DECADRON) injection 8 mg    7. Age-related osteoporosis without current pathological fracture  -     Vitamin D,25-Hydroxy    8. Personal history of nicotine dependence  -     CT Chest Low Dose Wo; Future    Other orders  -     fluticasone (FLONASE) 50 MCG/ACT nasal spray; 2 sprays into the nostril(s) as directed by provider Daily for 30 days.  Dispense: 18.2 mL; Refill: 0  -     azithromycin (Zithromax) 250 MG tablet; Take 2 tablets the first day, then 1 tablet daily for 9 days.  Dispense: 11 tablet; Refill: 0      Follow Up:  Next Medicare Wellness visit to be scheduled in 1 year.        Additional E&M Note during same encounter follows:  Patient has multiple medical problems which are significant and separately identifiable that require additional work above and beyond the Medicare Wellness Visit.      Chief Complaint  Medicare Wellness-subsequent (Requesting speak about Breast Exam, Bone Density Scan, & Medication review & Smoking Cessation. & Fasting Labs./) and Allergies (States allergies are  "worsening - ran out of nasal spray and seems like allegra alone is not helping./)    Subjective        Allergies  This is a chronic problem. The current episode started more than 1 year ago. The problem occurs constantly. The problem has been gradually worsening. Associated symptoms include congestion, coughing and headaches. She has tried nothing for the symptoms.   Sinus Problem  This is a new problem. The current episode started in the past 7 days. The problem has been gradually worsening since onset. There has been no fever. Associated symptoms include congestion, coughing, headaches and sinus pressure. Past treatments include nothing.     Radha Stanton is also being seen today for c/o follow up chronic illness 2. Med refill 3 fasting labs     Review of Systems   HENT:  Positive for congestion and sinus pressure.    Respiratory:  Positive for cough.        Objective   Vital Signs:  BP 98/72   Pulse 88   Temp 97.6 °F (36.4 °C) (Temporal)   Resp 18   Ht 157.5 cm (62\")   Wt 48.5 kg (107 lb)   SpO2 99%   BMI 19.57 kg/m²     Physical Exam  Vitals and nursing note reviewed.   Constitutional:       Appearance: Normal appearance.   HENT:      Head: Normocephalic.      Right Ear: Tympanic membrane normal.      Left Ear: Tympanic membrane normal.      Nose: Nose normal.      Mouth/Throat:      Mouth: Mucous membranes are moist.   Eyes:      Extraocular Movements: Extraocular movements intact.      Pupils: Pupils are equal, round, and reactive to light.   Cardiovascular:      Rate and Rhythm: Normal rate and regular rhythm.      Pulses: Normal pulses.      Heart sounds: Normal heart sounds.   Pulmonary:      Effort: Pulmonary effort is normal.      Breath sounds: Normal breath sounds.   Abdominal:      General: Abdomen is flat. Bowel sounds are normal.      Palpations: Abdomen is soft.   Musculoskeletal:         General: Normal range of motion.      Cervical back: Normal range of motion and neck supple.   Skin:     " General: Skin is warm.      Capillary Refill: Capillary refill takes less than 2 seconds.   Neurological:      General: No focal deficit present.      Mental Status: She is alert and oriented to person, place, and time.   Psychiatric:         Mood and Affect: Mood normal.          The following data was reviewed by: Johnathan Carey MD on 01/08/2024:  Common labs          5/17/2023    10:22 1/8/2024    11:53   Common Labs   Glucose 98  102    BUN 14  12    Creatinine 0.75  0.83    Sodium 138  142    Potassium 3.6  4.7    Chloride 103  105    Calcium 9.9  10.1    Albumin 4.1  4.0    Total Bilirubin 0.2  <0.2    Alkaline Phosphatase 121  94    AST (SGOT) 12  14    ALT (SGPT) 9  11    WBC 12.15  9.34    Hemoglobin 14.0  12.5    Hematocrit 44.1  39.0    Platelets 388  259    Total Cholesterol 154  175    Triglycerides 93  78    HDL Cholesterol 47  70    LDL Cholesterol  90  91    Hemoglobin A1C 5.90  5.60    Microalbumin, Urine <1.2       Data reviewed : Radiologic studies 5/.17/23 CT Chest            Assessment and Plan   Diagnoses and all orders for this visit:    1. Encounter for initial annual wellness visit (AWV) in Medicare patient (Primary)  -     CBC & Differential  -     Comprehensive Metabolic Panel  -     Hemoglobin A1c  -     Lipid Panel  -     Vitamin D,25-Hydroxy  -     Vitamin B12  -     POC Urinalysis Dipstick    2. Encounter for screening mammogram for malignant neoplasm of breast  -     Mammo Screening Digital Tomosynthesis Bilateral With CAD; Future    3. Multiple pulmonary nodules  -     CT Chest Low Dose Wo; Future    4. Medication management    5. Encounter for medication refill    6. Acute maxillary sinusitis, recurrence not specified  -     dexAMETHasone (DECADRON) injection 8 mg    7. Age-related osteoporosis without current pathological fracture  -     Vitamin D,25-Hydroxy    8. Personal history of nicotine dependence  -     CT Chest Low Dose Wo; Future    Other orders  -     fluticasone  (FLONASE) 50 MCG/ACT nasal spray; 2 sprays into the nostril(s) as directed by provider Daily for 30 days.  Dispense: 18.2 mL; Refill: 0  -     azithromycin (Zithromax) 250 MG tablet; Take 2 tablets the first day, then 1 tablet daily for 9 days.  Dispense: 11 tablet; Refill: 0           I spent 30 minutes caring for Radha on this date of service. This time includes time spent by me in the following activities:reviewing tests, obtaining and/or reviewing a separately obtained history, performing a medically appropriate examination and/or evaluation , counseling and educating the patient/family/caregiver, ordering medications, tests, or procedures, and documenting information in the medical record       Follow Up   Return in about 3 months (around 4/8/2024) for Recheck, RTC FASTING LABS.  Patient was given instructions and counseling regarding her condition or for health maintenance advice. Please see specific information pulled into the AVS if appropriate.             This document has been electronically signed by Johnathan Carey MD  January 8, 2024 17:18 EST

## 2024-01-09 LAB
25(OH)D3 SERPL-MCNC: 28.4 NG/ML (ref 30–100)
VIT B12 BLD-MCNC: 733 PG/ML (ref 211–946)

## 2024-01-10 NOTE — PROGRESS NOTES
Pt. Called and notified of lab results and supplements per Dr. Carey. Pt. Acknowledged and stated pharmacy will change to [facility] effective tomorrow 1/11/2024.

## 2024-01-17 ENCOUNTER — TELEPHONE (OUTPATIENT)
Dept: FAMILY MEDICINE CLINIC | Facility: CLINIC | Age: 69
End: 2024-01-17
Payer: MEDICARE

## 2024-01-17 NOTE — TELEPHONE ENCOUNTER
"Relay     \"Dr. Carey wanted to let you know that she has put in a referral for a follow up CT scan of your lungs.\"                 "

## 2024-01-18 ENCOUNTER — TELEPHONE (OUTPATIENT)
Dept: FAMILY MEDICINE CLINIC | Facility: CLINIC | Age: 69
End: 2024-01-18
Payer: MEDICARE

## 2024-01-18 NOTE — TELEPHONE ENCOUNTER
Name: Radha Stanton      Relationship: Self      Best Callback Number: [phone]       HUB PROVIDED THE RELAY MESSAGE FROM THE OFFICE      PATIENT: VOICED UNDERSTANDING AND HAS NO FURTHER QUESTIONS AT THIS TIME    ADDITIONAL INFORMATION:

## 2024-02-21 ENCOUNTER — TELEPHONE (OUTPATIENT)
Dept: FAMILY MEDICINE CLINIC | Facility: CLINIC | Age: 69
End: 2024-02-21
Payer: MEDICARE

## 2024-02-21 NOTE — TELEPHONE ENCOUNTER
PT RETURNED PROVIDER CALL.  ADVISED PROVIDER IN ROOM WITH PT, BUT WOULD RETURN CALL @ EARLIEST CONVENIENCE.

## 2024-04-09 ENCOUNTER — OFFICE VISIT (OUTPATIENT)
Dept: FAMILY MEDICINE CLINIC | Facility: CLINIC | Age: 69
End: 2024-04-09
Payer: MEDICARE

## 2024-04-09 VITALS
SYSTOLIC BLOOD PRESSURE: 140 MMHG | OXYGEN SATURATION: 98 % | WEIGHT: 105 LBS | BODY MASS INDEX: 19.32 KG/M2 | DIASTOLIC BLOOD PRESSURE: 85 MMHG | RESPIRATION RATE: 18 BRPM | HEART RATE: 74 BPM | HEIGHT: 62 IN | TEMPERATURE: 98 F

## 2024-04-09 DIAGNOSIS — I10 HYPERTENSION, UNSPECIFIED TYPE: ICD-10-CM

## 2024-04-09 DIAGNOSIS — R35.0 URINARY FREQUENCY: ICD-10-CM

## 2024-04-09 DIAGNOSIS — E78.49 OTHER HYPERLIPIDEMIA: Primary | ICD-10-CM

## 2024-04-09 DIAGNOSIS — M81.0 AGE-RELATED OSTEOPOROSIS WITHOUT CURRENT PATHOLOGICAL FRACTURE: ICD-10-CM

## 2024-04-09 DIAGNOSIS — E11.65 TYPE 2 DIABETES MELLITUS WITH HYPERGLYCEMIA, WITHOUT LONG-TERM CURRENT USE OF INSULIN: ICD-10-CM

## 2024-04-09 PROBLEM — R53.1 ASTHENIA: Status: ACTIVE | Noted: 2020-03-10

## 2024-04-09 LAB
ALBUMIN SERPL-MCNC: 4.1 G/DL (ref 3.5–5.2)
ALBUMIN/GLOB SERPL: 1.5 G/DL
ALP SERPL-CCNC: 116 U/L (ref 39–117)
ALT SERPL W P-5'-P-CCNC: 11 U/L (ref 1–33)
ANION GAP SERPL CALCULATED.3IONS-SCNC: 12.4 MMOL/L (ref 5–15)
AST SERPL-CCNC: 13 U/L (ref 1–32)
BASOPHILS # BLD AUTO: 0.11 10*3/MM3 (ref 0–0.2)
BASOPHILS NFR BLD AUTO: 1.1 % (ref 0–1.5)
BILIRUB BLD-MCNC: NEGATIVE MG/DL
BILIRUB SERPL-MCNC: 0.2 MG/DL (ref 0–1.2)
BUN SERPL-MCNC: 12 MG/DL (ref 8–23)
BUN/CREAT SERPL: 15.2 (ref 7–25)
CALCIUM SPEC-SCNC: 10.1 MG/DL (ref 8.6–10.5)
CHLORIDE SERPL-SCNC: 109 MMOL/L (ref 98–107)
CHOLEST SERPL-MCNC: 175 MG/DL (ref 0–200)
CLARITY, POC: CLEAR
CO2 SERPL-SCNC: 25.6 MMOL/L (ref 22–29)
COLOR UR: YELLOW
CREAT SERPL-MCNC: 0.79 MG/DL (ref 0.57–1)
DEPRECATED RDW RBC AUTO: 49.7 FL (ref 37–54)
EGFRCR SERPLBLD CKD-EPI 2021: 82.1 ML/MIN/1.73
EOSINOPHIL # BLD AUTO: 0.16 10*3/MM3 (ref 0–0.4)
EOSINOPHIL NFR BLD AUTO: 1.7 % (ref 0.3–6.2)
ERYTHROCYTE [DISTWIDTH] IN BLOOD BY AUTOMATED COUNT: 13.2 % (ref 12.3–15.4)
GLOBULIN UR ELPH-MCNC: 2.8 GM/DL
GLUCOSE SERPL-MCNC: 99 MG/DL (ref 65–99)
GLUCOSE UR STRIP-MCNC: NEGATIVE MG/DL
HBA1C MFR BLD: 5.6 % (ref 4.8–5.6)
HCT VFR BLD AUTO: 44.6 % (ref 34–46.6)
HDLC SERPL-MCNC: 67 MG/DL (ref 40–60)
HGB BLD-MCNC: 13.7 G/DL (ref 12–15.9)
IMM GRANULOCYTES # BLD AUTO: 0.05 10*3/MM3 (ref 0–0.05)
IMM GRANULOCYTES NFR BLD AUTO: 0.5 % (ref 0–0.5)
KETONES UR QL: NEGATIVE
LDLC SERPL CALC-MCNC: 96 MG/DL (ref 0–100)
LDLC/HDLC SERPL: 1.43 {RATIO}
LEUKOCYTE EST, POC: NEGATIVE
LYMPHOCYTES # BLD AUTO: 1.68 10*3/MM3 (ref 0.7–3.1)
LYMPHOCYTES NFR BLD AUTO: 17.3 % (ref 19.6–45.3)
MCH RBC QN AUTO: 31.2 PG (ref 26.6–33)
MCHC RBC AUTO-ENTMCNC: 30.7 G/DL (ref 31.5–35.7)
MCV RBC AUTO: 101.6 FL (ref 79–97)
MONOCYTES # BLD AUTO: 0.86 10*3/MM3 (ref 0.1–0.9)
MONOCYTES NFR BLD AUTO: 8.9 % (ref 5–12)
NEUTROPHILS NFR BLD AUTO: 6.83 10*3/MM3 (ref 1.7–7)
NEUTROPHILS NFR BLD AUTO: 70.5 % (ref 42.7–76)
NITRITE UR-MCNC: NEGATIVE MG/ML
NRBC BLD AUTO-RTO: 0 /100 WBC (ref 0–0.2)
PH UR: 6 [PH] (ref 5–8)
PLATELET # BLD AUTO: 284 10*3/MM3 (ref 140–450)
PMV BLD AUTO: 11.4 FL (ref 6–12)
POTASSIUM SERPL-SCNC: 4.4 MMOL/L (ref 3.5–5.2)
PROT SERPL-MCNC: 6.9 G/DL (ref 6–8.5)
PROT UR STRIP-MCNC: NEGATIVE MG/DL
RBC # BLD AUTO: 4.39 10*6/MM3 (ref 3.77–5.28)
RBC # UR STRIP: NEGATIVE /UL
SODIUM SERPL-SCNC: 147 MMOL/L (ref 136–145)
SP GR UR: 1.01 (ref 1–1.03)
TRIGL SERPL-MCNC: 60 MG/DL (ref 0–150)
UROBILINOGEN UR QL: NORMAL
VLDLC SERPL-MCNC: 12 MG/DL (ref 5–40)
WBC NRBC COR # BLD AUTO: 9.69 10*3/MM3 (ref 3.4–10.8)

## 2024-04-09 PROCEDURE — 83036 HEMOGLOBIN GLYCOSYLATED A1C: CPT | Performed by: PSYCHOLOGIST

## 2024-04-09 PROCEDURE — 80053 COMPREHEN METABOLIC PANEL: CPT | Performed by: PSYCHOLOGIST

## 2024-04-09 PROCEDURE — G2211 COMPLEX E/M VISIT ADD ON: HCPCS | Performed by: PSYCHOLOGIST

## 2024-04-09 PROCEDURE — 99214 OFFICE O/P EST MOD 30 MIN: CPT | Performed by: PSYCHOLOGIST

## 2024-04-09 PROCEDURE — 3044F HG A1C LEVEL LT 7.0%: CPT | Performed by: PSYCHOLOGIST

## 2024-04-09 PROCEDURE — 1159F MED LIST DOCD IN RCRD: CPT | Performed by: PSYCHOLOGIST

## 2024-04-09 PROCEDURE — 3077F SYST BP >= 140 MM HG: CPT | Performed by: PSYCHOLOGIST

## 2024-04-09 PROCEDURE — 81002 URINALYSIS NONAUTO W/O SCOPE: CPT | Performed by: PSYCHOLOGIST

## 2024-04-09 PROCEDURE — 1160F RVW MEDS BY RX/DR IN RCRD: CPT | Performed by: PSYCHOLOGIST

## 2024-04-09 PROCEDURE — 3079F DIAST BP 80-89 MM HG: CPT | Performed by: PSYCHOLOGIST

## 2024-04-09 PROCEDURE — 85025 COMPLETE CBC W/AUTO DIFF WBC: CPT | Performed by: PSYCHOLOGIST

## 2024-04-09 PROCEDURE — 82306 VITAMIN D 25 HYDROXY: CPT | Performed by: PSYCHOLOGIST

## 2024-04-09 PROCEDURE — 80061 LIPID PANEL: CPT | Performed by: PSYCHOLOGIST

## 2024-04-09 RX ORDER — UBROGEPANT 100 MG/1
100 TABLET ORAL
COMMUNITY
Start: 2024-02-19

## 2024-04-09 RX ORDER — ERENUMAB-AOOE 140 MG/ML
INJECTION, SOLUTION SUBCUTANEOUS
COMMUNITY

## 2024-04-09 RX ORDER — CIPROFLOXACIN 250 MG/1
250 TABLET, FILM COATED ORAL 2 TIMES DAILY WITH MEALS
Qty: 14 TABLET | Refills: 0 | Status: SHIPPED | OUTPATIENT
Start: 2024-04-09 | End: 2024-04-16

## 2024-04-09 NOTE — ASSESSMENT & PLAN NOTE
Hypertension is stable and controlled  Continue current treatment regimen.  Dietary sodium restriction.  Weight loss.  Regular aerobic exercise.  Stop smoking.  Blood pressure will be reassessed in 3 months.

## 2024-04-09 NOTE — PROGRESS NOTES
"Chief Complaint  Med Management, Urinary Frequency (Patient denies any discomfort of possible UTI), and Med Refill (Patient is requesting a refill be sent to [facility] in [place] [facility]///)    Subjective        Radha Stanton presents to [facility]  C/o follow up chronic illness 2. Urinary frequency    Urinary Frequency   This is a new problem. The current episode started in the past 7 days. The problem occurs intermittently. The problem has been worse. The patient is experiencing no pain. There has been no fever. There is no history of pyelonephritis. Associated symptoms include frequency. Pertinent negatives include no discharge, hematuria, nausea or vomiting. She has tried nothing for the symptoms.       Objective   Vital Signs:  /85 (BP Location: Left arm, Patient Position: Sitting, Cuff Size: Adult)   Pulse 74   Temp 98 °F (36.7 °C) (Temporal)   Resp 18   Ht 157.5 cm (62\")   Wt 47.6 kg (105 lb)   SpO2 98%   BMI 19.20 kg/m²   Estimated body mass index is 19.2 kg/m² as calculated from the following:    Height as of this encounter: 157.5 cm (62\").    Weight as of this encounter: 47.6 kg (105 lb).    BMI is within normal parameters. No other follow-up for BMI required.      Physical Exam  Vitals and nursing note reviewed.   Constitutional:       Appearance: Normal appearance.   HENT:      Head: Normocephalic.      Right Ear: Tympanic membrane normal.      Left Ear: Tympanic membrane normal.      Nose: Nose normal.      Mouth/Throat:      Mouth: Mucous membranes are moist.   Eyes:      Extraocular Movements: Extraocular movements intact.      Pupils: Pupils are equal, round, and reactive to light.   Cardiovascular:      Rate and Rhythm: Normal rate and regular rhythm.      Pulses: Normal pulses.      Heart sounds: Normal heart sounds.   Pulmonary:      Effort: Pulmonary effort is normal.      Breath sounds: Normal breath sounds.   Abdominal:      General: Abdomen is " flat. Bowel sounds are normal.      Palpations: Abdomen is soft.   Musculoskeletal:         General: Normal range of motion.      Cervical back: Normal range of motion and neck supple.   Skin:     General: Skin is warm.      Capillary Refill: Capillary refill takes less than 2 seconds.   Neurological:      General: No focal deficit present.      Mental Status: She is alert and oriented to person, place, and time.   Psychiatric:         Mood and Affect: Mood normal.        Result Review :  The following data was reviewed by: Johnathan Carey MD on 04/09/2024:  Common labs          5/17/2023    10:22 1/8/2024    11:53   Common Labs   Glucose 98  102    BUN 14  12    Creatinine 0.75  0.83    Sodium 138  142    Potassium 3.6  4.7    Chloride 103  105    Calcium 9.9  10.1    Albumin 4.1  4.0    Total Bilirubin 0.2  <0.2    Alkaline Phosphatase 121  94    AST (SGOT) 12  14    ALT (SGPT) 9  11    WBC 12.15  9.34    Hemoglobin 14.0  12.5    Hematocrit 44.1  39.0    Platelets 388  259    Total Cholesterol 154  175    Triglycerides 93  78    HDL Cholesterol 47  70    LDL Cholesterol  90  91    Hemoglobin A1C 5.90  5.60    Microalbumin, Urine <1.2       Data reviewed : Radiologic studies 2/7/24 DEXA            Assessment and Plan   Diagnoses and all orders for this visit:    1. Other hyperlipidemia (Primary)  Assessment & Plan:   Lipid abnormalities are improving with treatment    Plan:  Continue same medication/s without change.      Discussed medication dosage, use, side effects, and goals of treatment in detail.    Counseled patient on lifestyle modifications to help control hyperlipidemia.     Patient Treatment Goals:   LDL goal is less than 70    Followup at the next regular appointment.    Orders:  -     Vitamin D,25-Hydroxy  -     Lipid Panel  -     CBC & Differential  -     POC Urinalysis Dipstick  -     Hemoglobin A1c  -     Comprehensive Metabolic Panel    2. Type 2 diabetes mellitus with hyperglycemia, without  long-term current use of insulin  Assessment & Plan:  Diabetes is improving with treatment.   Continue current treatment regimen.  Recommended an ADA diet.  Recommended a Mediterranean style of eating  Regular aerobic exercise.  Discussed ways to avoid symptomatic hypoglycemia.  Discussed sick day management.  Discussed foot care.  Reminded to get yearly retinal exam.  Diabetes will be reassessed in 3 months    Orders:  -     Vitamin D,25-Hydroxy  -     Lipid Panel  -     CBC & Differential  -     POC Urinalysis Dipstick  -     Hemoglobin A1c  -     Comprehensive Metabolic Panel    3. Hypertension, unspecified type  Assessment & Plan:  Hypertension is stable and controlled  Continue current treatment regimen.  Dietary sodium restriction.  Weight loss.  Regular aerobic exercise.  Stop smoking.  Blood pressure will be reassessed in 3 months.    Orders:  -     Vitamin D,25-Hydroxy  -     Lipid Panel  -     CBC & Differential  -     POC Urinalysis Dipstick  -     Hemoglobin A1c  -     Comprehensive Metabolic Panel    4. Urinary frequency  -     Vitamin D,25-Hydroxy  -     Lipid Panel  -     CBC & Differential  -     POC Urinalysis Dipstick  -     Hemoglobin A1c  -     Comprehensive Metabolic Panel    5. Age-related osteoporosis without current pathological fracture  -     Vitamin D,25-Hydroxy    Other orders  -     ciprofloxacin (Cipro) 250 MG tablet; Take 1 tablet by mouth 2 (Two) Times a Day With Meals for 7 days.  Dispense: 14 tablet; Refill: 0           I spent 20 minutes caring for Radha on this date of service. This time includes time spent by me in the following activities:reviewing tests, obtaining and/or reviewing a separately obtained history, performing a medically appropriate examination and/or evaluation , counseling and educating the patient/family/caregiver, ordering medications, tests, or procedures, and documenting information in the medical record       Follow Up   Return in about 6 weeks (around  5/21/2024) for Annual physical, RTC FASTING LABS.  Patient was given instructions and counseling regarding her condition or for health maintenance advice. Please see specific information pulled into the AVS if appropriate.           This document has been electronically signed by Johnathan Carey MD  April 9, 2024 11:32 EDT

## 2024-04-09 NOTE — ASSESSMENT & PLAN NOTE
Diabetes is improving with treatment.   Continue current treatment regimen.  Recommended an ADA diet.  Recommended a Mediterranean style of eating  Regular aerobic exercise.  Discussed ways to avoid symptomatic hypoglycemia.  Discussed sick day management.  Discussed foot care.  Reminded to get yearly retinal exam.  Diabetes will be reassessed in 3 months

## 2024-04-10 LAB — 25(OH)D3 SERPL-MCNC: 29.1 NG/ML (ref 30–100)

## 2024-05-21 ENCOUNTER — OFFICE VISIT (OUTPATIENT)
Dept: FAMILY MEDICINE CLINIC | Facility: CLINIC | Age: 69
End: 2024-05-21
Payer: MEDICARE

## 2024-05-21 VITALS
DIASTOLIC BLOOD PRESSURE: 79 MMHG | HEART RATE: 59 BPM | RESPIRATION RATE: 18 BRPM | SYSTOLIC BLOOD PRESSURE: 130 MMHG | TEMPERATURE: 98.2 F | HEIGHT: 62 IN | BODY MASS INDEX: 19.58 KG/M2 | WEIGHT: 106.4 LBS | OXYGEN SATURATION: 100 %

## 2024-05-21 DIAGNOSIS — F17.210 CIGARETTE NICOTINE DEPENDENCE WITHOUT COMPLICATION: ICD-10-CM

## 2024-05-21 DIAGNOSIS — I10 HYPERTENSION, UNSPECIFIED TYPE: ICD-10-CM

## 2024-05-21 DIAGNOSIS — R91.1 PULMONARY NODULE, LEFT: ICD-10-CM

## 2024-05-21 DIAGNOSIS — E78.49 OTHER HYPERLIPIDEMIA: Primary | ICD-10-CM

## 2024-05-21 DIAGNOSIS — Z87.891 PERSONAL HISTORY OF NICOTINE DEPENDENCE: ICD-10-CM

## 2024-05-21 DIAGNOSIS — E11.65 TYPE 2 DIABETES MELLITUS WITH HYPERGLYCEMIA, WITHOUT LONG-TERM CURRENT USE OF INSULIN: ICD-10-CM

## 2024-05-21 PROCEDURE — 99397 PER PM REEVAL EST PAT 65+ YR: CPT | Performed by: PSYCHOLOGIST

## 2024-05-21 PROCEDURE — 3075F SYST BP GE 130 - 139MM HG: CPT | Performed by: PSYCHOLOGIST

## 2024-05-21 PROCEDURE — 1159F MED LIST DOCD IN RCRD: CPT | Performed by: PSYCHOLOGIST

## 2024-05-21 PROCEDURE — 1160F RVW MEDS BY RX/DR IN RCRD: CPT | Performed by: PSYCHOLOGIST

## 2024-05-21 PROCEDURE — 82043 UR ALBUMIN QUANTITATIVE: CPT | Performed by: PSYCHOLOGIST

## 2024-05-21 PROCEDURE — 1126F AMNT PAIN NOTED NONE PRSNT: CPT | Performed by: PSYCHOLOGIST

## 2024-05-21 PROCEDURE — 3044F HG A1C LEVEL LT 7.0%: CPT | Performed by: PSYCHOLOGIST

## 2024-05-21 PROCEDURE — 2014F MENTAL STATUS ASSESS: CPT | Performed by: PSYCHOLOGIST

## 2024-05-21 PROCEDURE — 3078F DIAST BP <80 MM HG: CPT | Performed by: PSYCHOLOGIST

## 2024-05-21 RX ORDER — NICOTINE 21 MG/24HR
1 PATCH, TRANSDERMAL 24 HOURS TRANSDERMAL EVERY 24 HOURS
Qty: 28 PATCH | Refills: 1 | Status: SHIPPED | OUTPATIENT
Start: 2024-05-21 | End: 2024-07-16

## 2024-05-21 NOTE — ASSESSMENT & PLAN NOTE
Tobacco use is worsening.  Smoking cessation counseling was provided.  Pharmacotherapy was prescribed as ordered.  Tobacco use will be reassessed at the next regular appointment.

## 2024-05-21 NOTE — ASSESSMENT & PLAN NOTE
Diabetes is improving with treatment.   Continue current treatment regimen.  Regular aerobic exercise.  Discussed ways to avoid symptomatic hypoglycemia.  Discussed sick day management.  Discussed foot care.  Reminded to get yearly retinal exam.  Diabetes will be reassessed in 3 months

## 2024-05-21 NOTE — PROGRESS NOTES
Chief Complaint  Annual Exam (NOT FASTING FOR LABS) and Diabetes (WANTING TO TALKS ABOUT A1c. SHE IS BORDERLINE DIABETIC. HAVING SOME MEDICATION SIDE EFFECTS TO KATHE.)    Subjective        Radha Stanton presents to [facility]  C/o 1. Annual Physical Exam  2. Fasting Labs 3. Chronic illness   Diabetes  She presents for her follow-up diabetic visit. She has type 2 diabetes mellitus. No MedicAlert identification noted. Pertinent negatives for hypoglycemia include no headaches or sweats. Pertinent negatives for diabetes include no chest pain. Symptoms are improving. Current diabetic treatment includes diet and oral agent (monotherapy). She is compliant with treatment all of the time. Her weight is stable. She is following a generally healthy diet. When asked about meal planning, she reported none. She has not had a previous visit with a dietitian. She participates in exercise intermittently. An ACE inhibitor/angiotensin II receptor blocker is being taken. She does not see a podiatrist. Eye exam is current.   Nicotine Dependence  Presents for initial visit. Preferred tobacco types include cigarettes. Preferred cigarette types include filtered. Preferred strength is light. Preferred cigarettes are non-menthol. Preferred brands include Basic. Her urge triggers include company of smokers, drinking coffee, driving, meal time and stress. Her first smoke is from 6 to 8 AM. She smokes < 1/2 a pack of cigarettes per day. She started smoking when she was >19 years old. Past treatments include nicotine inhaler. Radha is ready to quit. Radha has tried to quit 5 or more times. There is no history of alcohol abuse and drug use.   Hypertension  This is a chronic problem. The current episode started more than 1 year ago. The problem has been stable since onset. The problem is controlled. Pertinent negatives include no chest pain, headaches, palpitations, shortness of breath or sweats. Risk factors for  "coronary artery disease include post-menopausal state, smoking/tobacco exposure, dyslipidemia and diabetes mellitus. Past treatments include lifestyle changes and ACE inhibitors. Current antihypertension treatment includes lifestyle changes and ACE inhibitors. There are no compliance problems.  There is no history of angina, kidney disease or CAD/MI. There is no history of chronic renal disease or a thyroid problem.   Hyperlipidemia  This is a chronic problem. The current episode started more than 1 year ago. The problem is controlled. She has no history of chronic renal disease. Pertinent negatives include no chest pain, leg pain or shortness of breath. Current antihyperlipidemic treatment includes statins. The current treatment provides moderate improvement of lipids. There are no compliance problems.  Risk factors for coronary artery disease include hypertension, post-menopausal and diabetes mellitus.       Objective   Vital Signs:  /79 (BP Location: Right arm, Patient Position: Sitting, Cuff Size: Adult)   Pulse 59   Temp 98.2 °F (36.8 °C) (Temporal)   Resp 18   Ht 157.5 cm (62\")   Wt 48.3 kg (106 lb 6.4 oz)   SpO2 100%   BMI 19.46 kg/m²   Estimated body mass index is 19.46 kg/m² as calculated from the following:    Height as of this encounter: 157.5 cm (62\").    Weight as of this encounter: 48.3 kg (106 lb 6.4 oz).    BMI is within normal parameters. No other follow-up for BMI required.      Physical Exam  Vitals and nursing note reviewed. Exam conducted with a chaperone present.   Constitutional:       General: She is awake.      Appearance: Normal appearance. She is well-developed, well-groomed and normal weight.   HENT:      Head: Normocephalic and atraumatic.      Jaw: There is normal jaw occlusion.      Right Ear: Hearing, tympanic membrane, ear canal and external ear normal.      Left Ear: Hearing, tympanic membrane, ear canal and external ear normal.      Nose: Nose normal.      " Mouth/Throat:      Lips: Pink.      Mouth: Mucous membranes are moist.      Pharynx: Oropharynx is clear.   Eyes:      General: Lids are normal. Vision grossly intact. Gaze aligned appropriately.      Extraocular Movements: Extraocular movements intact.      Conjunctiva/sclera: Conjunctivae normal.      Pupils: Pupils are equal, round, and reactive to light.   Neck:      Trachea: Trachea and phonation normal.   Cardiovascular:      Rate and Rhythm: Normal rate and regular rhythm.      Pulses: Normal pulses.      Heart sounds: Normal heart sounds.   Pulmonary:      Effort: Pulmonary effort is normal.      Breath sounds: Normal breath sounds and air entry.   Abdominal:      General: Abdomen is flat. Bowel sounds are normal.      Palpations: Abdomen is soft.   Genitourinary:     Rectum: Normal.   Musculoskeletal:         General: Normal range of motion.      Right shoulder: Normal.      Left shoulder: Normal.      Right upper arm: Normal.      Left upper arm: Normal.      Right elbow: Normal.      Left elbow: Normal.      Right forearm: Normal.      Left forearm: Normal.      Right wrist: Normal.      Left wrist: Normal.      Right hand: Normal.      Left hand: Normal.      Cervical back: Normal, full passive range of motion without pain, normal range of motion and neck supple.      Thoracic back: Normal.      Lumbar back: Normal.      Right hip: Normal.      Left hip: Normal.      Right upper leg: Normal.      Left upper leg: Normal.      Right knee: Normal.      Left knee: Normal.      Right lower leg: Normal. No edema.      Left lower leg: Normal. No edema.      Right ankle: Normal.      Right Achilles Tendon: Normal.      Left ankle: Normal.      Left Achilles Tendon: Normal.      Right foot: Normal.      Left foot: Normal.   Lymphadenopathy:      Cervical: No cervical adenopathy.   Skin:     General: Skin is warm.      Capillary Refill: Capillary refill takes less than 2 seconds.   Neurological:      General: No  focal deficit present.      Mental Status: She is alert and oriented to person, place, and time.      Cranial Nerves: Cranial nerves 2-12 are intact.      Sensory: Sensation is intact.      Motor: Motor function is intact.      Coordination: Coordination is intact.      Gait: Gait is intact.      Deep Tendon Reflexes: Reflexes are normal and symmetric.   Psychiatric:         Attention and Perception: Attention and perception normal.         Mood and Affect: Mood and affect normal.         Speech: Speech normal.         Behavior: Behavior normal. Behavior is cooperative.         Thought Content: Thought content normal.         Cognition and Memory: Cognition and memory normal.         Judgment: Judgment normal.        Result Review :  The following data was reviewed by: Johnathan Carey MD on 05/21/2024:  Common labs          1/8/2024    11:53 4/9/2024    11:25   Common Labs   Glucose 102  99    BUN 12  12    Creatinine 0.83  0.79    Sodium 142  147    Potassium 4.7  4.4    Chloride 105  109    Calcium 10.1  10.1    Albumin 4.0  4.1    Total Bilirubin <0.2  0.2    Alkaline Phosphatase 94  116    AST (SGOT) 14  13    ALT (SGPT) 11  11    WBC 9.34  9.69    Hemoglobin 12.5  13.7    Hematocrit 39.0  44.6    Platelets 259  284    Total Cholesterol 175  175    Triglycerides 78  60    HDL Cholesterol 70  67    LDL Cholesterol  91  96    Hemoglobin A1C 5.60  5.60      Data reviewed : Radiologic studies 2/7/24 DEXA            Assessment and Plan   Diagnoses and all orders for this visit:    1. Other hyperlipidemia (Primary)  Assessment & Plan:   Lipid abnormalities are improving with treatment    Plan:  Continue same medication/s without change.      Discussed medication dosage, use, side effects, and goals of treatment in detail.    Counseled patient on lifestyle modifications to help control hyperlipidemia.     Patient Treatment Goals:   LDL goal is less than 70    Followup at the next regular appointment.      2.  Hypertension, unspecified type  Assessment & Plan:  Hypertension is stable and controlled  Continue current treatment regimen.  Dietary sodium restriction.  Weight loss.  Regular aerobic exercise.  Blood pressure will be reassessed in 3 months.      3. Type 2 diabetes mellitus with hyperglycemia, without long-term current use of insulin  Assessment & Plan:  Diabetes is improving with treatment.   Continue current treatment regimen.  Regular aerobic exercise.  Discussed ways to avoid symptomatic hypoglycemia.  Discussed sick day management.  Discussed foot care.  Reminded to get yearly retinal exam.  Diabetes will be reassessed in 3 months    Orders:  -     MicroAlbumin, Urine, Random - Urine, Clean Catch; Future    4. Cigarette nicotine dependence without complication  Assessment & Plan:  Tobacco use is worsening.  Smoking cessation counseling was provided.  Pharmacotherapy was prescribed as ordered.  Tobacco use will be reassessed at the next regular appointment.                                            5. Pulmonary nodule, left  -     CT Chest Low Dose Wo; Future    6. Personal history of nicotine dependence  -     CT Chest Low Dose Wo; Future    Other orders  -     nicotine (Nicoderm CQ) 14 MG/24HR patch; Place 1 patch on the skin as directed by provider Daily for 56 days.  Dispense: 28 patch; Refill: 1           I spent 30 minutes caring for Radha on this date of service. This time includes time spent by me in the following activities:reviewing tests, obtaining and/or reviewing a separately obtained history, performing a medically appropriate examination and/or evaluation , counseling and educating the patient/family/caregiver, ordering medications, tests, or procedures, and documenting information in the medical record     The preventive exam has been reviewed in detail.  The patient has been fully counseled on preventative guidelines for vaccines, cancer screenings, and other health maintenance needs.   The  patient has been counseled on guidelines for maintaining a lifestyle to promote good health and to minimize chronic diseases.  The patient has been assisted with scheduling these healthcare procedures for the coming year and given a written document of health maintenance and anticipatory guidance for age with the AVS.       Follow Up   Return in about 1 year (around 5/22/2025) for Annual physical, RTC FASTING LABS & 1 month forff/up smoking cessation visit .  Patient was given instructions and counseling regarding her condition or for health maintenance advice. Please see specific information pulled into the AVS if appropriate.           This document has been electronically signed by Johnathan Carey MD  May 21, 2024 10:28 EDT

## 2024-05-22 ENCOUNTER — PATIENT MESSAGE (OUTPATIENT)
Dept: FAMILY MEDICINE CLINIC | Facility: CLINIC | Age: 69
End: 2024-05-22
Payer: MEDICARE

## 2024-05-22 LAB — ALBUMIN UR-MCNC: <1.2 MG/DL

## 2024-05-23 ENCOUNTER — TELEPHONE (OUTPATIENT)
Dept: FAMILY MEDICINE CLINIC | Facility: CLINIC | Age: 69
End: 2024-05-23

## 2024-05-23 NOTE — TELEPHONE ENCOUNTER
Caller: Radha Stanton    Relationship: Self    Best call back number: [phone]     What is the medical concern/diagnosis: PULMONOLOGY    What specialty or service is being requested: PULMONOLOGIST    What is the provider, practice or medical service name: [facility], AS CLOSE TO [place] AS POSSIBLE

## 2024-05-23 NOTE — TELEPHONE ENCOUNTER
Caller: Radha Stanton    Relationship: Self    Best call back number: [phone]     Which medication are you concerned about: NICOTINE    Who prescribed you this medication: DR. SNELL    When did you start taking this medication: NOT STARTED    What are your concerns: THIS MEDICATION REQUIRES A PRIOR AUTHORIZATION.

## 2024-05-24 ENCOUNTER — TELEPHONE (OUTPATIENT)
Dept: FAMILY MEDICINE CLINIC | Facility: CLINIC | Age: 69
End: 2024-05-24
Payer: MEDICARE

## 2024-05-24 ENCOUNTER — PRIOR AUTHORIZATION (OUTPATIENT)
Dept: FAMILY MEDICINE CLINIC | Facility: CLINIC | Age: 69
End: 2024-05-24
Payer: MEDICARE

## 2024-06-03 ENCOUNTER — TELEPHONE (OUTPATIENT)
Dept: FAMILY MEDICINE CLINIC | Facility: CLINIC | Age: 69
End: 2024-06-03

## 2024-06-03 RX ORDER — NICOTINE 21 MG/24HR
1 PATCH, TRANSDERMAL 24 HOURS TRANSDERMAL EVERY 24 HOURS
Qty: 28 PATCH | Refills: 1 | Status: SHIPPED | OUTPATIENT
Start: 2024-06-03 | End: 2024-07-29

## 2024-06-03 RX ORDER — METOPROLOL SUCCINATE 25 MG/1
25 TABLET, EXTENDED RELEASE ORAL DAILY
Qty: 30 TABLET | Refills: 1 | Status: SHIPPED | OUTPATIENT
Start: 2024-06-03

## 2024-06-03 RX ORDER — HYDROXYCHLOROQUINE SULFATE 200 MG/1
200 TABLET, FILM COATED ORAL DAILY
Qty: 30 TABLET | Refills: 1 | Status: SHIPPED | OUTPATIENT
Start: 2024-06-03

## 2024-06-03 NOTE — TELEPHONE ENCOUNTER
Caller: Radha Stanton    Relationship: Self    Best call back number:   [phone] (Mobile)     Requested Prescriptions:   Requested Prescriptions     Pending Prescriptions Disp Refills    hydroxychloroquine (PLAQUENIL) 200 MG tablet       Sig: Take 1 tablet by mouth Daily.    metoprolol succinate XL (TOPROL-XL) 25 MG 24 hr tablet       Sig: Take 1 tablet by mouth Daily.      nicotine (Nicoderm CQ) 14 MG/24HR patch   PATIENT SAID SHE WAS DIAGNOSED  WITH SECOND STAGE EMPHYSEMA  BY   DR LAURA PULMONOLOGIST THIS YEAR     Pharmacy where request should be sent: [facility] [address] - [phone] [facility] [phone]      Last office visit with prescribing clinician: 5/21/2024   Last telemedicine visit with prescribing clinician: Visit date not found   Next office visit with prescribing clinician: 6/20/2024     Additional details provided by patient:      Does the patient have less than a 3 day supply:  [] Yes  [x] No    Would you like a call back once the refill request has been completed: [] Yes [x] No    If the office needs to give you a call back, can they leave a voicemail: [] Yes [x] No

## 2024-06-03 NOTE — TELEPHONE ENCOUNTER
I have reviewed discharge instructions with the patient. The patient verbalized understanding. Patient ambulated independently out of care area. Referral put in.

## 2024-06-03 NOTE — TELEPHONE ENCOUNTER
DELETE AFTER REVIEWING: Send this encounter to the appropriate pool. See your Call Action Grid or Workflows for direction.    Caller: [facility]    Relationship:     Best call back number: [phone]     Which medication are you concerned about: hydroxychloroquine (PLAQUENIL) 200 MG tablet     Who prescribed you this medication: DR SNELL    What are your concerns: PHARMACY NEEDS A DIAGNOSIS CODE FOR INSURER.

## 2024-06-07 NOTE — TELEPHONE ENCOUNTER
Patient came by office and was informed of referral to pulmonology, also requested to see Dr Gustafson here in [place] per  staff.

## 2024-06-10 ENCOUNTER — TELEPHONE (OUTPATIENT)
Dept: FAMILY MEDICINE CLINIC | Facility: CLINIC | Age: 69
End: 2024-06-10

## 2024-06-13 RX ORDER — FLUTICASONE PROPIONATE 50 MCG
2 SPRAY, SUSPENSION (ML) NASAL DAILY
Qty: 18.2 ML | Refills: 0 | Status: SHIPPED | OUTPATIENT
Start: 2024-06-13 | End: 2024-07-13

## 2024-06-13 NOTE — TELEPHONE ENCOUNTER
Rx Refill Note  Requested Prescriptions     Pending Prescriptions Disp Refills    fluticasone (FLONASE) 50 MCG/ACT nasal spray 18.2 mL 0     Si sprays into the nostril(s) as directed by provider Daily for 30 days.      Last office visit with prescribing clinician: Visit date not found   Last telemedicine visit with prescribing clinician: Visit date not found   Next office visit with prescribing clinician: Visit date not found                         Would you like a call back once the refill request has been completed: [] Yes [] No    If the office needs to give you a call back, can they leave a voicemail: [] Yes [] No    Marta Manriquez, TERELL  24, 14:58 EDT

## 2024-06-27 ENCOUNTER — OFFICE VISIT (OUTPATIENT)
Dept: FAMILY MEDICINE CLINIC | Facility: CLINIC | Age: 69
End: 2024-06-27
Payer: MEDICARE

## 2024-06-27 VITALS
HEART RATE: 78 BPM | OXYGEN SATURATION: 98 % | WEIGHT: 105.8 LBS | HEIGHT: 62 IN | RESPIRATION RATE: 18 BRPM | DIASTOLIC BLOOD PRESSURE: 75 MMHG | TEMPERATURE: 97.2 F | SYSTOLIC BLOOD PRESSURE: 119 MMHG | BODY MASS INDEX: 19.47 KG/M2

## 2024-06-27 DIAGNOSIS — F17.210 CIGARETTE NICOTINE DEPENDENCE WITHOUT COMPLICATION: ICD-10-CM

## 2024-06-27 DIAGNOSIS — M05.79 RHEUMATOID ARTHRITIS INVOLVING MULTIPLE SITES WITH POSITIVE RHEUMATOID FACTOR: ICD-10-CM

## 2024-06-27 DIAGNOSIS — M81.0 AGE-RELATED OSTEOPOROSIS WITHOUT CURRENT PATHOLOGICAL FRACTURE: ICD-10-CM

## 2024-06-27 DIAGNOSIS — K21.9 GASTROESOPHAGEAL REFLUX DISEASE WITHOUT ESOPHAGITIS: ICD-10-CM

## 2024-06-27 DIAGNOSIS — E78.49 OTHER HYPERLIPIDEMIA: Primary | ICD-10-CM

## 2024-06-27 DIAGNOSIS — E11.65 TYPE 2 DIABETES MELLITUS WITH HYPERGLYCEMIA, WITHOUT LONG-TERM CURRENT USE OF INSULIN: ICD-10-CM

## 2024-06-27 PROCEDURE — 3078F DIAST BP <80 MM HG: CPT | Performed by: PSYCHOLOGIST

## 2024-06-27 PROCEDURE — 1160F RVW MEDS BY RX/DR IN RCRD: CPT | Performed by: PSYCHOLOGIST

## 2024-06-27 PROCEDURE — 3074F SYST BP LT 130 MM HG: CPT | Performed by: PSYCHOLOGIST

## 2024-06-27 PROCEDURE — 99213 OFFICE O/P EST LOW 20 MIN: CPT | Performed by: PSYCHOLOGIST

## 2024-06-27 PROCEDURE — 1159F MED LIST DOCD IN RCRD: CPT | Performed by: PSYCHOLOGIST

## 2024-06-27 PROCEDURE — 1126F AMNT PAIN NOTED NONE PRSNT: CPT | Performed by: PSYCHOLOGIST

## 2024-06-27 PROCEDURE — 3044F HG A1C LEVEL LT 7.0%: CPT | Performed by: PSYCHOLOGIST

## 2024-06-27 RX ORDER — VARENICLINE TARTRATE 1 MG/1
1 TABLET, FILM COATED ORAL 2 TIMES DAILY
Qty: 60 TABLET | Refills: 0 | Status: SHIPPED | OUTPATIENT
Start: 2024-06-27 | End: 2024-07-27

## 2024-06-27 RX ORDER — HYDROXYCHLOROQUINE SULFATE 200 MG/1
200 TABLET, FILM COATED ORAL DAILY
Qty: 90 TABLET | Refills: 0 | Status: SHIPPED | OUTPATIENT
Start: 2024-06-27 | End: 2024-09-25

## 2024-06-27 RX ORDER — VARENICLINE TARTRATE 0.5 MG/1
TABLET, FILM COATED ORAL
Qty: 11 TABLET | Refills: 0 | Status: SHIPPED | OUTPATIENT
Start: 2024-06-27

## 2024-06-27 RX ORDER — PRAVASTATIN SODIUM 20 MG
20 TABLET ORAL DAILY
Qty: 30 TABLET | Refills: 2 | Status: SHIPPED | OUTPATIENT
Start: 2024-06-27 | End: 2024-09-25

## 2024-06-27 RX ORDER — ESOMEPRAZOLE MAGNESIUM 40 MG/1
40 CAPSULE, DELAYED RELEASE ORAL
Qty: 90 CAPSULE | Refills: 0 | Status: SHIPPED | OUTPATIENT
Start: 2024-06-27 | End: 2024-09-25

## 2024-06-27 NOTE — PROGRESS NOTES
Chief Complaint  Follow-up (Follow up on hyperlipidemia and med refills)    Subjective        Radha Stanton presents to [facility]  C/o follow up chronic illness   Diabetes  She presents for her follow-up diabetic visit. She has type 2 diabetes mellitus. No MedicAlert identification noted. Her disease course has been improving. Pertinent negatives for hypoglycemia include no headaches, hunger, seizures or tremors. Pertinent negatives for diabetes include no chest pain. Symptoms are improving. Current diabetic treatment includes diet and oral agent (monotherapy). She is compliant with treatment all of the time. Her weight is stable. She is following a generally healthy diet. She has not had a previous visit with a dietitian. She participates in exercise five times a week. An ACE inhibitor/angiotensin II receptor blocker is being taken.  Eye exam is current.   Hyperlipidemia  This is a chronic problem. The current episode started more than 1 year ago. The problem is controlled. Factors aggravating her hyperlipidemia include smoking. Pertinent negatives include no chest pain, focal sensory loss, leg pain or shortness of breath. Current antihyperlipidemic treatment includes herbal therapy. The current treatment provides moderate improvement of lipids. There are no compliance problems.  Risk factors for coronary artery disease include post-menopausal, hypertension and diabetes mellitus.   Nicotine Dependence  Presents for follow-up visit. The symptoms have been improving. Her first smoke is from 8 to 10 AM. She smokes < 1/2 a pack of cigarettes per day. Compliance with prior treatments has been good.   Heartburn  She reports no abdominal pain, no chest pain, no nausea or no wheezing. This is a chronic problem. The current episode started more than 1 year ago. The problem has been waxing and waning. She has tried a PPI for the symptoms.   Arthritis  Presents for follow-up visit. She reports  "no pain, stiffness, joint swelling or joint warmth. Pertinent negatives include no pain at night or pain while resting. Compliance with total regimen is %. Compliance with medications is %.       Objective   Vital Signs:  /75 (BP Location: Right arm, Patient Position: Sitting, Cuff Size: Adult)   Pulse 78   Temp 97.2 °F (36.2 °C) (Temporal)   Resp 18   Ht 157.5 cm (62\")   Wt 48 kg (105 lb 12.8 oz)   SpO2 98%   BMI 19.35 kg/m²   Estimated body mass index is 19.35 kg/m² as calculated from the following:    Height as of this encounter: 157.5 cm (62\").    Weight as of this encounter: 48 kg (105 lb 12.8 oz).    BMI is within normal parameters. No other follow-up for BMI required.      Physical Exam  Vitals and nursing note reviewed.   Constitutional:       Appearance: Normal appearance.   HENT:      Head: Normocephalic.      Right Ear: Tympanic membrane normal.      Left Ear: Tympanic membrane normal.      Nose: Nose normal.      Mouth/Throat:      Mouth: Mucous membranes are moist.   Eyes:      Extraocular Movements: Extraocular movements intact.      Pupils: Pupils are equal, round, and reactive to light.   Cardiovascular:      Rate and Rhythm: Normal rate and regular rhythm.      Pulses: Normal pulses.      Heart sounds: Normal heart sounds.   Pulmonary:      Effort: Pulmonary effort is normal.      Breath sounds: Normal breath sounds.   Abdominal:      General: Abdomen is flat. Bowel sounds are normal.      Palpations: Abdomen is soft.   Musculoskeletal:         General: Normal range of motion.      Cervical back: Normal range of motion and neck supple.   Skin:     General: Skin is warm.      Capillary Refill: Capillary refill takes less than 2 seconds.   Neurological:      General: No focal deficit present.      Mental Status: She is alert and oriented to person, place, and time.   Psychiatric:         Mood and Affect: Mood normal.        Result Review :  The following data was reviewed by: " Johnathan Carey MD on 06/27/2024:  Common labs          1/8/2024    11:53 4/9/2024    11:25 5/21/2024    10:36   Common Labs   Glucose 102  99     BUN 12  12     Creatinine 0.83  0.79     Sodium 142  147     Potassium 4.7  4.4     Chloride 105  109     Calcium 10.1  10.1     Albumin 4.0  4.1     Total Bilirubin <0.2  0.2     Alkaline Phosphatase 94  116     AST (SGOT) 14  13     ALT (SGPT) 11  11     WBC 9.34  9.69     Hemoglobin 12.5  13.7     Hematocrit 39.0  44.6     Platelets 259  284     Total Cholesterol 175  175     Triglycerides 78  60     HDL Cholesterol 70  67     LDL Cholesterol  91  96     Hemoglobin A1C 5.60  5.60     Microalbumin, Urine   <1.2      Data reviewed : Radiologic studies 1/8/24 CTLD           Assessment and Plan   Diagnoses and all orders for this visit:    1. Other hyperlipidemia (Primary)  Assessment & Plan:   Lipid abnormalities are improving with treatment    Plan:  Continue same medication/s without change.      Discussed medication dosage, use, side effects, and goals of treatment in detail.    Counseled patient on lifestyle modifications to help control hyperlipidemia.     Patient Treatment Goals:   LDL goal is less than 70    Followup at the next regular appointment.    Orders:  -     CBC & Differential; Future  -     Comprehensive Metabolic Panel; Future  -     Lipid Panel; Future  -     Vitamin D,25-Hydroxy; Future  -     Hemoglobin A1c; Future    2. Type 2 diabetes mellitus with hyperglycemia, without long-term current use of insulin  Assessment & Plan:  Diabetes is improving with treatment.   Continue current treatment regimen.  Regular aerobic exercise.  Discussed ways to avoid symptomatic hypoglycemia.  Discussed sick day management.  Discussed foot care.  Reminded to get yearly retinal exam.  Diabetes will be reassessed in 3 months    Orders:  -     CBC & Differential; Future  -     Comprehensive Metabolic Panel; Future  -     Lipid Panel; Future  -     Vitamin  D,25-Hydroxy; Future  -     Hemoglobin A1c; Future    3. Cigarette nicotine dependence without complication  Assessment & Plan:  Tobacco use is improving with lifestyle modifications.  Smoking cessation counseling was provided.  Tobacco use will be reassessed at the next regular appointment.      Will start chantix since insurance did not cover Nicodern patch                                          4. Gastroesophageal reflux disease without esophagitis  Assessment & Plan:  Will need med refill    Orders:  -     CBC & Differential; Future  -     Comprehensive Metabolic Panel; Future  -     Lipid Panel; Future  -     Vitamin D,25-Hydroxy; Future    5. Rheumatoid arthritis involving multiple sites with positive rheumatoid factor  Assessment & Plan:  Current med helping need a refill      6. Age-related osteoporosis without current pathological fracture  -     Vitamin D,25-Hydroxy; Future    Other orders  -     pravastatin (PRAVACHOL) 20 MG tablet; Take 1 tablet by mouth Daily for 90 days.  Dispense: 30 tablet; Refill: 2  -     varenicline (Chantix) 0.5 MG tablet; Days 1 to 3: 0.5 mg orally once a day Days 4 to 7: 0.5 mg orally twice a day  Dispense: 11 tablet; Refill: 0  -     varenicline (Chantix Continuing Month Toni) 1 MG tablet; Take 1 tablet by mouth 2 (Two) Times a Day for 30 days.  Dispense: 60 tablet; Refill: 0  -     esomeprazole (nexIUM) 40 MG capsule; Take 1 capsule by mouth Every Morning Before Breakfast for 90 days.  Dispense: 90 capsule; Refill: 0  -     hydroxychloroquine (PLAQUENIL) 200 MG tablet; Take 1 tablet by mouth Daily for 90 days.  Dispense: 90 tablet; Refill: 0           I spent 20 minutes caring for Radha on this date of service. This time includes time spent by me in the following activities:reviewing tests, obtaining and/or reviewing a separately obtained history, performing a medically appropriate examination and/or evaluation , counseling and educating the patient/family/caregiver, ordering  medications, tests, or procedures, and documenting information in the medical record       Follow Up   Return in about 13 days (around 7/10/2024) for Recheck, RTC FASTING LABS/ nicotine dependence chantix started .  Patient was given instructions and counseling regarding her condition or for health maintenance advice. Please see specific information pulled into the AVS if appropriate.           This document has been electronically signed by Johntahan Carey MD  June 27, 2024 14:39 EDT

## 2024-06-27 NOTE — LETTER
[address] [address]  [phone]  Dept: [phone]    24    RE:   Patient Name:  Radha Stanton   :  1955      Dear :  Denise:     Radha is my patient, and has been under my care since .  I am familiar with her history and with the functional limitations imposed by her disability.  She meets the definition of disability under the Americans with Disabilities Act, the Fair Housing Act, and the Rehabilitation Act of .     Due to mental illness, Radha has certain limitations regarding coping and anxiety.  In order to help alleviate these difficulties, and to enhance his/her ability to live independently and to fully use and enjoy the dwelling unit you own and/or administer, I am prescribing an emotional support animal that will assist Radha in coping with her disability.    I would be happy to answer other questions you may have concerning my recommendation that Radha have an emotional support animal.  Should you have additional questions, please do not hesitate to contact me.    Sincerely,          This document has been electronically signed by Johnathan Carey MD  2024 14:34 EDT

## 2024-06-27 NOTE — LETTER
June 27, 2024    Radha Stanton  [address] [facility] [place] [address]            June 27,2024      To whom it may concern,    Radha Stanton, whom resides at [address] [facility] [place] [address] and is reachable at [phone], has been an established primary care patient at [facility] located at [address] [place] [address]  phone number [phone], Since 2022 . Radha Stanton is treated by myself Johnathan Carey MD. It is in my professional opinion that Radha Stanton suffers from SALVADOR/Depression      Sincerely,        This document has been electronically signed by Johnathan Carey MD  June 27, 2024 14:38 EDT     [facility]

## 2024-06-27 NOTE — ASSESSMENT & PLAN NOTE
Tobacco use is improving with lifestyle modifications.  Smoking cessation counseling was provided.  Tobacco use will be reassessed at the next regular appointment.      Will start chantix since insurance did not cover Nicodern patch

## 2024-07-08 ENCOUNTER — PRIOR AUTHORIZATION (OUTPATIENT)
Dept: FAMILY MEDICINE CLINIC | Facility: CLINIC | Age: 69
End: 2024-07-08
Payer: MEDICARE

## 2024-07-08 NOTE — TELEPHONE ENCOUNTER
(Key: [id number])  Rx #: [id number]    Available without authorization. The member is able to fill the requested drug at the pharmacy. If coverage is still needed or requesting prior to the expiration of a current authorization, a request can be made by sending a fax or calling the number on the back of the member's ID card.

## 2024-07-29 ENCOUNTER — OFFICE VISIT (OUTPATIENT)
Dept: FAMILY MEDICINE CLINIC | Facility: CLINIC | Age: 69
End: 2024-07-29
Payer: MEDICARE

## 2024-07-29 VITALS
HEART RATE: 82 BPM | SYSTOLIC BLOOD PRESSURE: 126 MMHG | RESPIRATION RATE: 20 BRPM | HEIGHT: 62 IN | DIASTOLIC BLOOD PRESSURE: 74 MMHG | BODY MASS INDEX: 18.95 KG/M2 | OXYGEN SATURATION: 100 % | WEIGHT: 103 LBS | TEMPERATURE: 97.8 F

## 2024-07-29 DIAGNOSIS — F17.210 CIGARETTE NICOTINE DEPENDENCE WITHOUT COMPLICATION: Primary | ICD-10-CM

## 2024-07-29 DIAGNOSIS — E78.49 OTHER HYPERLIPIDEMIA: ICD-10-CM

## 2024-07-29 DIAGNOSIS — M81.0 AGE-RELATED OSTEOPOROSIS WITHOUT CURRENT PATHOLOGICAL FRACTURE: ICD-10-CM

## 2024-07-29 DIAGNOSIS — E11.65 TYPE 2 DIABETES MELLITUS WITH HYPERGLYCEMIA, WITHOUT LONG-TERM CURRENT USE OF INSULIN: ICD-10-CM

## 2024-07-29 DIAGNOSIS — K21.9 GASTROESOPHAGEAL REFLUX DISEASE WITHOUT ESOPHAGITIS: ICD-10-CM

## 2024-07-29 LAB
ALBUMIN SERPL-MCNC: 4.1 G/DL (ref 3.5–5.2)
ALBUMIN/GLOB SERPL: 1.3 G/DL
ALP SERPL-CCNC: 123 U/L (ref 39–117)
ALT SERPL W P-5'-P-CCNC: 8 U/L (ref 1–33)
ANION GAP SERPL CALCULATED.3IONS-SCNC: 11.3 MMOL/L (ref 5–15)
AST SERPL-CCNC: 14 U/L (ref 1–32)
BASOPHILS # BLD AUTO: 0.11 10*3/MM3 (ref 0–0.2)
BASOPHILS NFR BLD AUTO: 1 % (ref 0–1.5)
BILIRUB SERPL-MCNC: 0.2 MG/DL (ref 0–1.2)
BUN SERPL-MCNC: 11 MG/DL (ref 8–23)
BUN/CREAT SERPL: 13.3 (ref 7–25)
CALCIUM SPEC-SCNC: 10.2 MG/DL (ref 8.6–10.5)
CHLORIDE SERPL-SCNC: 102 MMOL/L (ref 98–107)
CHOLEST SERPL-MCNC: 174 MG/DL (ref 0–200)
CO2 SERPL-SCNC: 25.7 MMOL/L (ref 22–29)
CREAT SERPL-MCNC: 0.83 MG/DL (ref 0.57–1)
DEPRECATED RDW RBC AUTO: 50.7 FL (ref 37–54)
EGFRCR SERPLBLD CKD-EPI 2021: 77.4 ML/MIN/1.73
EOSINOPHIL # BLD AUTO: 0.14 10*3/MM3 (ref 0–0.4)
EOSINOPHIL NFR BLD AUTO: 1.2 % (ref 0.3–6.2)
ERYTHROCYTE [DISTWIDTH] IN BLOOD BY AUTOMATED COUNT: 13.8 % (ref 12.3–15.4)
GLOBULIN UR ELPH-MCNC: 3.2 GM/DL
GLUCOSE SERPL-MCNC: 104 MG/DL (ref 65–99)
HBA1C MFR BLD: 6 % (ref 4.8–5.6)
HCT VFR BLD AUTO: 44.1 % (ref 34–46.6)
HDLC SERPL-MCNC: 61 MG/DL (ref 40–60)
HGB BLD-MCNC: 14 G/DL (ref 12–15.9)
IMM GRANULOCYTES # BLD AUTO: 0.07 10*3/MM3 (ref 0–0.05)
IMM GRANULOCYTES NFR BLD AUTO: 0.6 % (ref 0–0.5)
LDLC SERPL CALC-MCNC: 98 MG/DL (ref 0–100)
LDLC/HDLC SERPL: 1.59 {RATIO}
LYMPHOCYTES # BLD AUTO: 1.45 10*3/MM3 (ref 0.7–3.1)
LYMPHOCYTES NFR BLD AUTO: 12.9 % (ref 19.6–45.3)
MCH RBC QN AUTO: 31.7 PG (ref 26.6–33)
MCHC RBC AUTO-ENTMCNC: 31.7 G/DL (ref 31.5–35.7)
MCV RBC AUTO: 99.8 FL (ref 79–97)
MONOCYTES # BLD AUTO: 1.12 10*3/MM3 (ref 0.1–0.9)
MONOCYTES NFR BLD AUTO: 10 % (ref 5–12)
NEUTROPHILS NFR BLD AUTO: 74.3 % (ref 42.7–76)
NEUTROPHILS NFR BLD AUTO: 8.34 10*3/MM3 (ref 1.7–7)
NRBC BLD AUTO-RTO: 0 /100 WBC (ref 0–0.2)
PLATELET # BLD AUTO: 305 10*3/MM3 (ref 140–450)
PMV BLD AUTO: 11.9 FL (ref 6–12)
POTASSIUM SERPL-SCNC: 4.4 MMOL/L (ref 3.5–5.2)
PROT SERPL-MCNC: 7.3 G/DL (ref 6–8.5)
RBC # BLD AUTO: 4.42 10*6/MM3 (ref 3.77–5.28)
SODIUM SERPL-SCNC: 139 MMOL/L (ref 136–145)
TRIGL SERPL-MCNC: 79 MG/DL (ref 0–150)
VLDLC SERPL-MCNC: 15 MG/DL (ref 5–40)
WBC NRBC COR # BLD AUTO: 11.23 10*3/MM3 (ref 3.4–10.8)

## 2024-07-29 PROCEDURE — 85025 COMPLETE CBC W/AUTO DIFF WBC: CPT | Performed by: PSYCHOLOGIST

## 2024-07-29 PROCEDURE — 82306 VITAMIN D 25 HYDROXY: CPT | Performed by: PSYCHOLOGIST

## 2024-07-29 PROCEDURE — 83036 HEMOGLOBIN GLYCOSYLATED A1C: CPT | Performed by: PSYCHOLOGIST

## 2024-07-29 PROCEDURE — 1159F MED LIST DOCD IN RCRD: CPT | Performed by: PSYCHOLOGIST

## 2024-07-29 PROCEDURE — 80061 LIPID PANEL: CPT | Performed by: PSYCHOLOGIST

## 2024-07-29 PROCEDURE — 99214 OFFICE O/P EST MOD 30 MIN: CPT | Performed by: PSYCHOLOGIST

## 2024-07-29 PROCEDURE — 3078F DIAST BP <80 MM HG: CPT | Performed by: PSYCHOLOGIST

## 2024-07-29 PROCEDURE — 80053 COMPREHEN METABOLIC PANEL: CPT | Performed by: PSYCHOLOGIST

## 2024-07-29 PROCEDURE — 3074F SYST BP LT 130 MM HG: CPT | Performed by: PSYCHOLOGIST

## 2024-07-29 PROCEDURE — 1126F AMNT PAIN NOTED NONE PRSNT: CPT | Performed by: PSYCHOLOGIST

## 2024-07-29 PROCEDURE — 1160F RVW MEDS BY RX/DR IN RCRD: CPT | Performed by: PSYCHOLOGIST

## 2024-07-29 PROCEDURE — 3044F HG A1C LEVEL LT 7.0%: CPT | Performed by: PSYCHOLOGIST

## 2024-07-29 NOTE — ASSESSMENT & PLAN NOTE
Tobacco use is  getting better / her med just got approved and will start taking it .  Pharmacotherapy was prescribed as ordered.  Tobacco use will be reassessed at the next regular appointment.

## 2024-07-29 NOTE — PROGRESS NOTES
"Chief Complaint  Follow-up (States hasn't got Chantix, pharmacy said a PA had to be done; hyperlipidemia; pre-diabetic/Fasting labs)    Subjective        Radha Stanton presents to [facility]  C/o follow up chronic illness Radha Stanton  reports that she has been smoking cigarettes. She started smoking about 9 years ago. She has a 3.7 pack-year smoking history. She has never used smokeless tobacco. I have educated her on the risk of diseases from using tobacco products such as cancer, COPD, and heart disease.     I advised her to quit and she is willing to quit. We have discussed the following method/s for tobacco cessation:  Prescription Medication.  Together we have set a quit date for  this week .  She will follow up with me in 1 month or sooner to check on her progress.    I spent 5 minutes counseling the patient.         Follow-up  Conditions present:  Diabetes and Hyperlipidemia  Current symptoms include no chest pain, no shortness of breath, no blurred vision, no foot ulcerations, no polyuria, no leg pain and no myalgias. Medication compliance: good. Treatment compliance: all of the time. Treatment barriers: no compliance problems. Exercises daily.   Diabetes: She has type 2 diabetes mellitus. Current diabetic treatment: diet and oral medications. She monitors blood glucose at home not monitored. Meal planning: no current meal planning. Eye exam is current per patient.  She doesn't see a podiatrist.   Hyperlipidemia: Recent lipid tests were reviewed and are high. Current antihyperlipidemic treatment: statins, exercise and diet change.   Additional hyperlipidemia comments: HDL IS STILL HIGH.      Objective   Vital Signs:  /74 (BP Location: Right arm, Patient Position: Sitting, Cuff Size: Adult)   Pulse 82   Temp 97.8 °F (36.6 °C) (Temporal)   Resp 20   Ht 157.5 cm (62\")   Wt 46.7 kg (103 lb)   SpO2 100%   BMI 18.84 kg/m²   Estimated body mass index is 18.84 kg/m² as " "calculated from the following:    Height as of this encounter: 157.5 cm (62\").    Weight as of this encounter: 46.7 kg (103 lb).    BMI is within normal parameters. No other follow-up for BMI required.      Physical Exam  Vitals and nursing note reviewed.   Constitutional:       Appearance: Normal appearance.   HENT:      Head: Normocephalic.      Right Ear: Tympanic membrane normal.      Left Ear: Tympanic membrane normal.      Nose: Nose normal.      Mouth/Throat:      Mouth: Mucous membranes are moist.   Eyes:      Extraocular Movements: Extraocular movements intact.      Pupils: Pupils are equal, round, and reactive to light.   Cardiovascular:      Rate and Rhythm: Normal rate and regular rhythm.      Pulses: Normal pulses.      Heart sounds: Normal heart sounds.   Pulmonary:      Effort: Pulmonary effort is normal.      Breath sounds: Normal breath sounds.   Abdominal:      General: Abdomen is flat. Bowel sounds are normal.      Palpations: Abdomen is soft.   Musculoskeletal:         General: Normal range of motion.      Cervical back: Normal range of motion and neck supple.   Skin:     General: Skin is warm.      Capillary Refill: Capillary refill takes less than 2 seconds.   Neurological:      General: No focal deficit present.      Mental Status: She is alert and oriented to person, place, and time.   Psychiatric:         Mood and Affect: Mood normal.        Diabetic foot exam:   Left: Filament test present   Pulses Dorsalis Pedis:  present  Posterior Tibial:  present   Reflexes 4+    Vibratory sensation normal   Proprioception normal   Sharp/dull discrimination normal       Right: Filament test present   Pulses Dorsalis Pedis:  present  Posterior Tibial:  present   Reflexes 4+    Vibratory sensation normal   Proprioception normal   Sharp/dull discrimination normal     Result Review :  The following data was reviewed by: Johnathan Carey MD on 07/29/2024:  Common labs          1/8/2024    11:53 4/9/2024 "    11:25 5/21/2024    10:36   Common Labs   Glucose 102  99     BUN 12  12     Creatinine 0.83  0.79     Sodium 142  147     Potassium 4.7  4.4     Chloride 105  109     Calcium 10.1  10.1     Albumin 4.0  4.1     Total Bilirubin <0.2  0.2     Alkaline Phosphatase 94  116     AST (SGOT) 14  13     ALT (SGPT) 11  11     WBC 9.34  9.69     Hemoglobin 12.5  13.7     Hematocrit 39.0  44.6     Platelets 259  284     Total Cholesterol 175  175     Triglycerides 78  60     HDL Cholesterol 70  67     LDL Cholesterol  91  96     Hemoglobin A1C 5.60  5.60     Microalbumin, Urine   <1.2      Data reviewed : Radiologic studies 2/7/24 DEXA            Assessment and Plan   Diagnoses and all orders for this visit:    1. Cigarette nicotine dependence without complication (Primary)  Assessment & Plan:  Tobacco use is  getting better / her med just got approved and will start taking it .  Pharmacotherapy was prescribed as ordered.  Tobacco use will be reassessed at the next regular appointment.                                            2. Other hyperlipidemia  Assessment & Plan:   Lipid abnormalities are improving with treatment    Plan:  Continue same medication/s without change.      Discussed medication dosage, use, side effects, and goals of treatment in detail.    Counseled patient on lifestyle modifications to help control hyperlipidemia.     Patient Treatment Goals:   LDL goal is less than 70    Followup at the next regular appointment.    Orders:  -     CBC & Differential  -     Comprehensive Metabolic Panel  -     Lipid Panel  -     Vitamin D,25-Hydroxy  -     Hemoglobin A1c    3. Type 2 diabetes mellitus with hyperglycemia, without long-term current use of insulin  Assessment & Plan:  Diabetes is improving with treatment.   Continue current treatment regimen.  Regular aerobic exercise.  Discussed ways to avoid symptomatic hypoglycemia.  Discussed sick day management.  Discussed foot care.  Reminded to get yearly retinal  exam.  Diabetes will be reassessed in 3 months    Orders:  -     CBC & Differential  -     Comprehensive Metabolic Panel  -     Lipid Panel  -     Vitamin D,25-Hydroxy  -     Hemoglobin A1c    4. Gastroesophageal reflux disease without esophagitis  -     CBC & Differential  -     Comprehensive Metabolic Panel  -     Lipid Panel  -     Vitamin D,25-Hydroxy    5. Age-related osteoporosis without current pathological fracture  Assessment & Plan:  She is better now osteopenic    Orders:  -     Vitamin D,25-Hydroxy           I spent 30 minutes caring for Radha on this date of service. This time includes time spent by me in the following activities:reviewing tests, obtaining and/or reviewing a separately obtained history, performing a medically appropriate examination and/or evaluation , counseling and educating the patient/family/caregiver, ordering medications, tests, or procedures, and documenting information in the medical record       Follow Up   Return in about 1 month (around 8/30/2024) for Recheck-- smoking cessation started chantix.  Patient was given instructions and counseling regarding her condition or for health maintenance advice. Please see specific information pulled into the AVS if appropriate.           This document has been electronically signed by Johnathan Carey MD  July 29, 2024 11:33 EDT

## 2024-07-30 LAB — 25(OH)D3 SERPL-MCNC: 33.5 NG/ML (ref 30–100)

## 2024-08-14 RX ORDER — METOPROLOL SUCCINATE 25 MG/1
25 TABLET, EXTENDED RELEASE ORAL DAILY
Qty: 30 TABLET | Refills: 3 | Status: SHIPPED | OUTPATIENT
Start: 2024-08-14

## 2024-08-14 NOTE — TELEPHONE ENCOUNTER
Rx Refill Note  Requested Prescriptions     Pending Prescriptions Disp Refills    metoprolol succinate XL (TOPROL-XL) 25 MG 24 hr tablet [Pharmacy Med Name: METOPROLOL SUC 25MG ER] 30 tablet 0     Sig: TAKE 1 TABLET BY MOUTH DAILY.      Last office visit with prescribing clinician: 7/29/2024   Last telemedicine visit with prescribing clinician: Visit date not found   Next office visit with prescribing clinician: 8/30/2024                         Would you like a call back once the refill request has been completed: [] Yes [] No    If the office needs to give you a call back, can they leave a voicemail: [] Yes [] No    Marta Manriquez CMA  08/14/24, 16:01 EDT

## 2024-08-30 ENCOUNTER — OFFICE VISIT (OUTPATIENT)
Dept: FAMILY MEDICINE CLINIC | Facility: CLINIC | Age: 69
End: 2024-08-30
Payer: MEDICARE

## 2024-08-30 VITALS
OXYGEN SATURATION: 95 % | DIASTOLIC BLOOD PRESSURE: 85 MMHG | SYSTOLIC BLOOD PRESSURE: 150 MMHG | HEART RATE: 68 BPM | BODY MASS INDEX: 19.65 KG/M2 | HEIGHT: 62 IN | RESPIRATION RATE: 18 BRPM | WEIGHT: 106.8 LBS | TEMPERATURE: 98.4 F

## 2024-08-30 DIAGNOSIS — Z76.0 ENCOUNTER FOR MEDICATION REFILL: ICD-10-CM

## 2024-08-30 DIAGNOSIS — F17.210 CIGARETTE NICOTINE DEPENDENCE WITHOUT COMPLICATION: Primary | ICD-10-CM

## 2024-08-30 PROBLEM — K59.09 CHRONIC CONSTIPATION: Status: ACTIVE | Noted: 2023-05-31

## 2024-08-30 PROBLEM — K29.50 CHRONIC GASTRITIS: Status: ACTIVE | Noted: 2023-07-11

## 2024-08-30 PROBLEM — K57.30 DIVERTICULOSIS OF COLON: Status: ACTIVE | Noted: 2023-07-11

## 2024-08-30 PROCEDURE — 3077F SYST BP >= 140 MM HG: CPT | Performed by: PSYCHOLOGIST

## 2024-08-30 PROCEDURE — 1160F RVW MEDS BY RX/DR IN RCRD: CPT | Performed by: PSYCHOLOGIST

## 2024-08-30 PROCEDURE — 1126F AMNT PAIN NOTED NONE PRSNT: CPT | Performed by: PSYCHOLOGIST

## 2024-08-30 PROCEDURE — 99214 OFFICE O/P EST MOD 30 MIN: CPT | Performed by: PSYCHOLOGIST

## 2024-08-30 PROCEDURE — 1159F MED LIST DOCD IN RCRD: CPT | Performed by: PSYCHOLOGIST

## 2024-08-30 PROCEDURE — 3044F HG A1C LEVEL LT 7.0%: CPT | Performed by: PSYCHOLOGIST

## 2024-08-30 PROCEDURE — 3079F DIAST BP 80-89 MM HG: CPT | Performed by: PSYCHOLOGIST

## 2024-08-30 RX ORDER — IBUPROFEN 600 MG/1
600 TABLET, FILM COATED ORAL 3 TIMES DAILY
Qty: 270 TABLET | Refills: 3 | Status: SHIPPED | OUTPATIENT
Start: 2024-08-30 | End: 2025-08-25

## 2024-08-30 RX ORDER — SITAGLIPTIN 25 MG/1
25 TABLET, FILM COATED ORAL
Qty: 90 TABLET | Refills: 3 | Status: SHIPPED | OUTPATIENT
Start: 2024-08-30 | End: 2025-08-25

## 2024-08-30 RX ORDER — PRAVASTATIN SODIUM 20 MG
20 TABLET ORAL NIGHTLY
Qty: 90 TABLET | Refills: 3 | Status: SHIPPED | OUTPATIENT
Start: 2024-08-30 | End: 2025-08-25

## 2024-08-30 RX ORDER — ESOMEPRAZOLE MAGNESIUM 40 MG/1
40 CAPSULE, DELAYED RELEASE ORAL
Qty: 90 CAPSULE | Refills: 3 | Status: SHIPPED | OUTPATIENT
Start: 2024-08-30 | End: 2025-08-25

## 2024-08-30 RX ORDER — VARENICLINE TARTRATE 1 MG/1
TABLET, FILM COATED ORAL
Qty: 180 TABLET | Refills: 0 | Status: SHIPPED | OUTPATIENT
Start: 2024-08-30

## 2024-08-30 RX ORDER — HYDROXYCHLOROQUINE SULFATE 200 MG/1
200 TABLET, FILM COATED ORAL DAILY
Qty: 90 TABLET | Refills: 3 | Status: SHIPPED | OUTPATIENT
Start: 2024-08-30 | End: 2025-08-25

## 2024-08-30 RX ORDER — FLUTICASONE PROPIONATE 50 MCG
2 SPRAY, SUSPENSION (ML) NASAL DAILY
Qty: 18.2 ML | Refills: 11 | Status: SHIPPED | OUTPATIENT
Start: 2024-08-30 | End: 2024-09-29

## 2024-08-30 RX ORDER — MONTELUKAST SODIUM 10 MG/1
10 TABLET ORAL NIGHTLY
Qty: 90 TABLET | Refills: 3 | Status: SHIPPED | OUTPATIENT
Start: 2024-08-30 | End: 2025-08-25

## 2024-08-30 RX ORDER — METOPROLOL SUCCINATE 25 MG/1
25 TABLET, EXTENDED RELEASE ORAL
Qty: 90 TABLET | Refills: 3 | Status: SHIPPED | OUTPATIENT
Start: 2024-08-30 | End: 2025-08-25

## 2024-08-30 NOTE — ASSESSMENT & PLAN NOTE
Tobacco use is improving with treatment.  Smoking cessation counseling was provided.  Pharmacotherapy was prescribed as ordered.  Tobacco use will be reassessed at the next regular appointment.

## 2024-08-30 NOTE — PROGRESS NOTES
"Chief Complaint  Nicotine Dependence (1 month f/u on chantix. //NOT FASTING )    Subjective        Radha Stanton presents to [facility]IMARY [facility]  /O FOLLOW UP CHRONIC ILLNESS 2. MED REFILL   Nicotine Dependence  Presents for follow-up visit. The symptoms have been improving. Her first smoke is from 10 AM to noon. She smokes < 1/2 a pack of cigarettes per day. Compliance with prior treatments has been good.     Radha Stanton  reports that she has been smoking cigarettes. She started smoking about 9 years ago. She has a 3.7 pack-year smoking history. She has never used smokeless tobacco. I have educated her on the risk of diseases from using tobacco products such as cancer, COPD, and heart disease.     I advised her to quit and she is willing to quit. We have discussed the following method/s for tobacco cessation:  Counseling and Prescription Medication.  Together we have set a quit date for  1 MONTH AGO  .  She will follow up with me in 1 month or sooner to check on her progress.    I spent 5 minutes counseling the patient.       Objective   Vital Signs:  /85 (BP Location: Right arm, Patient Position: Sitting, Cuff Size: Adult)   Pulse 68   Temp 98.4 °F (36.9 °C) (Temporal)   Resp 18   Ht 157.5 cm (62\")   Wt 48.4 kg (106 lb 12.8 oz)   SpO2 95%   BMI 19.53 kg/m²   Estimated body mass index is 19.53 kg/m² as calculated from the following:    Height as of this encounter: 157.5 cm (62\").    Weight as of this encounter: 48.4 kg (106 lb 12.8 oz).    BMI is within normal parameters. No other follow-up for BMI required.      Physical Exam  Vitals and nursing note reviewed.   Constitutional:       Appearance: Normal appearance.   HENT:      Head: Normocephalic.      Right Ear: Tympanic membrane normal.      Left Ear: Tympanic membrane normal.      Nose: Nose normal.      Mouth/Throat:      Mouth: Mucous membranes are moist.   Eyes:      Extraocular Movements: Extraocular movements intact.    "   Pupils: Pupils are equal, round, and reactive to light.   Cardiovascular:      Rate and Rhythm: Normal rate and regular rhythm.      Pulses: Normal pulses.      Heart sounds: Normal heart sounds.   Pulmonary:      Effort: Pulmonary effort is normal.      Breath sounds: Normal breath sounds.   Abdominal:      General: Abdomen is flat. Bowel sounds are normal.      Palpations: Abdomen is soft.   Musculoskeletal:         General: Normal range of motion.      Cervical back: Normal range of motion and neck supple.   Skin:     General: Skin is warm.      Capillary Refill: Capillary refill takes less than 2 seconds.   Neurological:      General: No focal deficit present.      Mental Status: She is alert and oriented to person, place, and time.   Psychiatric:         Mood and Affect: Mood normal.        Result Review :  The following data was reviewed by: Johnathan Carey MD on 08/30/2024:  Common labs          4/9/2024    11:25 5/21/2024    10:36 7/29/2024    11:21   Common Labs   Glucose 99   104    BUN 12   11    Creatinine 0.79   0.83    Sodium 147   139    Potassium 4.4   4.4    Chloride 109   102    Calcium 10.1   10.2    Albumin 4.1   4.1    Total Bilirubin 0.2   0.2    Alkaline Phosphatase 116   123    AST (SGOT) 13   14    ALT (SGPT) 11   8    WBC 9.69   11.23    Hemoglobin 13.7   14.0    Hematocrit 44.6   44.1    Platelets 284   305    Total Cholesterol 175   174    Triglycerides 60   79    HDL Cholesterol 67   61    LDL Cholesterol  96   98    Hemoglobin A1C 5.60   6.00    Microalbumin, Urine  <1.2       Data reviewed : Radiologic studies 2/27/24 DEXA            Assessment and Plan   Diagnoses and all orders for this visit:    1. Cigarette nicotine dependence without complication (Primary)  Assessment & Plan:  Tobacco use is improving with treatment.  Smoking cessation counseling was provided.  Pharmacotherapy was prescribed as ordered.  Tobacco use will be reassessed at the next regular  appointment.                                            2. Encounter for medication refill    Other orders  -     Januvia 25 MG tablet; Take 1 tablet by mouth Daily With Breakfast for 360 days.  Dispense: 90 tablet; Refill: 3  -     varenicline (Chantix) 1 MG tablet; TAKE 1 TAB TWICE A DAY  Dispense: 180 tablet; Refill: 0  -     montelukast (SINGULAIR) 10 MG tablet; Take 1 tablet by mouth Every Night for 360 days.  Dispense: 90 tablet; Refill: 3  -     fluticasone (FLONASE) 50 MCG/ACT nasal spray; 2 sprays into the nostril(s) as directed by provider Daily for 30 days.  Dispense: 18.2 mL; Refill: 11  -     pravastatin (PRAVACHOL) 20 MG tablet; Take 1 tablet by mouth Every Night for 360 days.  Dispense: 90 tablet; Refill: 3  -     hydroxychloroquine (PLAQUENIL) 200 MG tablet; Take 1 tablet by mouth Daily for 360 days.  Dispense: 90 tablet; Refill: 3  -     metoprolol succinate XL (TOPROL-XL) 25 MG 24 hr tablet; Take 1 tablet by mouth Daily With Breakfast for 360 days.  Dispense: 90 tablet; Refill: 3  -     esomeprazole (nexIUM) 40 MG capsule; Take 1 capsule by mouth Every Morning Before Breakfast for 360 days.  Dispense: 90 capsule; Refill: 3  -     ibuprofen (ADVIL,MOTRIN) 600 MG tablet; Take 1 tablet by mouth 3 times a day for 360 days.  Dispense: 270 tablet; Refill: 3           I spent 30 minutes caring for Radha on this date of service. This time includes time spent by me in the following activities:reviewing tests, obtaining and/or reviewing a separately obtained history, performing a medically appropriate examination and/or evaluation , counseling and educating the patient/family/caregiver, ordering medications, tests, or procedures, and documenting information in the medical record       Follow Up   Return in about 1 month (around 9/30/2024) for Recheck SMOKING CESSSTION.  Patient was given instructions and counseling regarding her condition or for health maintenance advice. Please see specific information  pulled into the AVS if appropriate.           This document has been electronically signed by Johnathan Carey MD  August 30, 2024 11:28 EDT

## 2024-09-04 ENCOUNTER — EXTERNAL PBMM DATA (OUTPATIENT)
Dept: PHARMACY | Facility: OTHER | Age: 69
End: 2024-09-04
Payer: MEDICARE

## 2024-09-19 RX ORDER — OXCARBAZEPINE 150 MG/1
150 TABLET, FILM COATED ORAL EVERY 12 HOURS SCHEDULED
Qty: 60 TABLET | Refills: 3 | Status: SHIPPED | OUTPATIENT
Start: 2024-09-19

## 2024-10-09 ENCOUNTER — OFFICE VISIT (OUTPATIENT)
Dept: FAMILY MEDICINE CLINIC | Facility: CLINIC | Age: 69
End: 2024-10-09
Payer: MEDICARE

## 2024-10-09 VITALS
OXYGEN SATURATION: 99 % | HEIGHT: 62 IN | SYSTOLIC BLOOD PRESSURE: 109 MMHG | DIASTOLIC BLOOD PRESSURE: 63 MMHG | HEART RATE: 58 BPM | RESPIRATION RATE: 20 BRPM | WEIGHT: 109.4 LBS | BODY MASS INDEX: 20.13 KG/M2 | TEMPERATURE: 97.8 F

## 2024-10-09 DIAGNOSIS — Z76.0 ENCOUNTER FOR MEDICATION REFILL: ICD-10-CM

## 2024-10-09 DIAGNOSIS — F17.210 CIGARETTE NICOTINE DEPENDENCE WITHOUT COMPLICATION: Primary | ICD-10-CM

## 2024-10-09 PROBLEM — E11.9 TYPE 2 DIABETES MELLITUS: Status: ACTIVE | Noted: 2022-12-14

## 2024-10-09 PROBLEM — M19.90 OSTEOARTHRITIS: Status: ACTIVE | Noted: 2022-12-14

## 2024-10-09 PROCEDURE — 3074F SYST BP LT 130 MM HG: CPT | Performed by: PSYCHOLOGIST

## 2024-10-09 PROCEDURE — 1160F RVW MEDS BY RX/DR IN RCRD: CPT | Performed by: PSYCHOLOGIST

## 2024-10-09 PROCEDURE — 99214 OFFICE O/P EST MOD 30 MIN: CPT | Performed by: PSYCHOLOGIST

## 2024-10-09 PROCEDURE — 1159F MED LIST DOCD IN RCRD: CPT | Performed by: PSYCHOLOGIST

## 2024-10-09 PROCEDURE — 3078F DIAST BP <80 MM HG: CPT | Performed by: PSYCHOLOGIST

## 2024-10-09 RX ORDER — VARENICLINE TARTRATE 1 MG/1
TABLET, FILM COATED ORAL
Qty: 180 TABLET | Refills: 1 | Status: SHIPPED | OUTPATIENT
Start: 2024-10-09

## 2024-10-09 RX ORDER — OXCARBAZEPINE 150 MG/1
150 TABLET, FILM COATED ORAL 2 TIMES DAILY
Qty: 180 TABLET | Refills: 3 | Status: SHIPPED | OUTPATIENT
Start: 2024-10-09 | End: 2025-10-04

## 2024-10-09 NOTE — PROGRESS NOTES
"Chief Complaint  Nicotine Dependence (Follow up )    Subjective        Radha Stanton presents to [facility]IMARY [facility]  C/o follow up smoking cessation:  Radha Stanton  reports that she has been smoking cigarettes. She started smoking about 10 years ago. She has a 3.7 pack-year smoking history. She has never used smokeless tobacco. I have educated her on the risk of diseases from using tobacco products such as cancer, COPD, and heart disease.     I advised her to quit and she is willing to quit. We have discussed the following method/s for tobacco cessation:   ongoing now .  Together we have set a quit date for  started 2 mos ago .  She will follow up with me in 6 months or sooner to check on her progress.    I spent 5.5 minutes counseling the patient.       Objective   Vital Signs:  /63 (BP Location: Right arm, Patient Position: Sitting, Cuff Size: Adult)   Pulse 58   Temp 97.8 °F (36.6 °C) (Temporal)   Resp 20   Ht 157.5 cm (62\")   Wt 49.6 kg (109 lb 6.4 oz)   SpO2 99%   BMI 20.01 kg/m²   Estimated body mass index is 20.01 kg/m² as calculated from the following:    Height as of this encounter: 157.5 cm (62\").    Weight as of this encounter: 49.6 kg (109 lb 6.4 oz).    BMI is within normal parameters. No other follow-up for BMI required.      Physical Exam  Vitals and nursing note reviewed.   Constitutional:       Appearance: Normal appearance.   HENT:      Head: Normocephalic.      Right Ear: Tympanic membrane normal.      Left Ear: Tympanic membrane normal.      Nose: Nose normal.      Mouth/Throat:      Mouth: Mucous membranes are moist.   Eyes:      Extraocular Movements: Extraocular movements intact.      Pupils: Pupils are equal, round, and reactive to light.   Cardiovascular:      Rate and Rhythm: Normal rate and regular rhythm.      Pulses: Normal pulses.      Heart sounds: Normal heart sounds.   Pulmonary:      Effort: Pulmonary effort is normal.      Breath sounds: Normal " breath sounds.   Abdominal:      General: Abdomen is flat. Bowel sounds are normal.      Palpations: Abdomen is soft.   Musculoskeletal:         General: Normal range of motion.      Cervical back: Normal range of motion and neck supple.   Skin:     General: Skin is warm.      Capillary Refill: Capillary refill takes less than 2 seconds.   Neurological:      General: No focal deficit present.      Mental Status: She is alert and oriented to person, place, and time.   Psychiatric:         Mood and Affect: Mood normal.        Result Review :  The following data was reviewed by: Johnathan Carey MD on 10/09/2024:  Common labs          4/9/2024    11:25 5/21/2024    10:36 7/29/2024    11:21   Common Labs   Glucose 99   104    BUN 12   11    Creatinine 0.79   0.83    Sodium 147   139    Potassium 4.4   4.4    Chloride 109   102    Calcium 10.1   10.2    Albumin 4.1   4.1    Total Bilirubin 0.2   0.2    Alkaline Phosphatase 116   123    AST (SGOT) 13   14    ALT (SGPT) 11   8    WBC 9.69   11.23    Hemoglobin 13.7   14.0    Hematocrit 44.6   44.1    Platelets 284   305    Total Cholesterol 175   174    Triglycerides 60   79    HDL Cholesterol 67   61    LDL Cholesterol  96   98    Hemoglobin A1C 5.60   6.00    Microalbumin, Urine  <1.2       Data reviewed : Radiologic studies 2./7.24 DEXA           Assessment and Plan   Diagnoses and all orders for this visit:    1. Cigarette nicotine dependence without complication (Primary)  Assessment & Plan:  Tobacco use is improving with treatment.  Smoking cessation counseling was provided.  Pharmacotherapy was prescribed as ordered.  Tobacco use will be reassessed in 6 months.      She is now down to 3 /day                                            2. Encounter for medication refill    Other orders  -     varenicline (Chantix) 1 MG tablet; TAKE 1 TAB TWICE A DAY  Dispense: 180 tablet; Refill: 1  -     OXcarbazepine (TRILEPTAL) 150 MG tablet; Take 1 tablet by mouth 2 (Two)  Times a Day for 360 days.  Dispense: 180 tablet; Refill: 3           I spent 30 minutes caring for Radha on this date of service. This time includes time spent by me in the following activities:reviewing tests, obtaining and/or reviewing a separately obtained history, performing a medically appropriate examination and/or evaluation , counseling and educating the patient/family/caregiver, ordering medications, tests, or procedures, and documenting information in the medical record       Follow Up   Return if symptoms worsen or fail to improve.  Patient was given instructions and counseling regarding her condition or for health maintenance advice. Please see specific information pulled into the AVS if appropriate.           This document has been electronically signed by Johnathan Carey MD  October 9, 2024 11:40 EDT

## 2024-10-09 NOTE — ASSESSMENT & PLAN NOTE
Tobacco use is improving with treatment.  Smoking cessation counseling was provided.  Pharmacotherapy was prescribed as ordered.  Tobacco use will be reassessed in 6 months.      She is now down to 3 /day

## 2024-10-16 ENCOUNTER — OFFICE VISIT (OUTPATIENT)
Dept: FAMILY MEDICINE CLINIC | Age: 69
End: 2024-10-16
Payer: MEDICARE

## 2024-10-16 VITALS
WEIGHT: 109 LBS | OXYGEN SATURATION: 100 % | TEMPERATURE: 98 F | BODY MASS INDEX: 20.06 KG/M2 | HEART RATE: 61 BPM | HEIGHT: 62 IN | DIASTOLIC BLOOD PRESSURE: 62 MMHG | SYSTOLIC BLOOD PRESSURE: 112 MMHG

## 2024-10-16 DIAGNOSIS — F17.210 CIGARETTE NICOTINE DEPENDENCE WITHOUT COMPLICATION: ICD-10-CM

## 2024-10-16 DIAGNOSIS — K21.9 GASTROESOPHAGEAL REFLUX DISEASE WITHOUT ESOPHAGITIS: Primary | ICD-10-CM

## 2024-10-16 PROCEDURE — 1125F AMNT PAIN NOTED PAIN PRSNT: CPT | Performed by: FAMILY MEDICINE

## 2024-10-16 PROCEDURE — 3078F DIAST BP <80 MM HG: CPT | Performed by: FAMILY MEDICINE

## 2024-10-16 PROCEDURE — 3074F SYST BP LT 130 MM HG: CPT | Performed by: FAMILY MEDICINE

## 2024-10-16 PROCEDURE — 3044F HG A1C LEVEL LT 7.0%: CPT | Performed by: FAMILY MEDICINE

## 2024-10-16 PROCEDURE — 99213 OFFICE O/P EST LOW 20 MIN: CPT | Performed by: FAMILY MEDICINE

## 2024-10-16 RX ORDER — PANTOPRAZOLE SODIUM 40 MG/1
40 TABLET, DELAYED RELEASE ORAL DAILY
Qty: 30 TABLET | Refills: 0 | Status: SHIPPED | OUTPATIENT
Start: 2024-10-16 | End: 2024-11-15

## 2024-10-16 NOTE — PROGRESS NOTES
"Chief Complaint  Cigarette nicotine dependence without complication    Subjective        Radha Stanton presents to [facility]  History of Present Illness  The patient is a 69 y.o. female who is here today for follow up on GERD and nicotine dependence. The patient states that the Esomeprazole is not helping and wants to try something else for her reflux. She tried the nicotine patches which helped some but she did not completely quit smoking.      Objective   Vital Signs:  /62 (BP Location: Left arm, Patient Position: Sitting, Cuff Size: Adult)   Pulse 61   Temp 98 °F (36.7 °C) (Temporal)   Ht 157.5 cm (62\")   Wt 49.4 kg (109 lb)   SpO2 100%   BMI 19.94 kg/m²   Estimated body mass index is 19.94 kg/m² as calculated from the following:    Height as of this encounter: 157.5 cm (62\").    Weight as of this encounter: 49.4 kg (109 lb).    BMI is within normal parameters. No other follow-up for BMI required.      Physical Exam  Vitals and nursing note reviewed.   Constitutional:       Appearance: Normal appearance.   HENT:      Head: Normocephalic and atraumatic.      Right Ear: Tympanic membrane and ear canal normal.      Left Ear: Tympanic membrane and ear canal normal.      Nose: Nose normal.      Mouth/Throat:      Mouth: Mucous membranes are moist.   Eyes:      Extraocular Movements: Extraocular movements intact.      Pupils: Pupils are equal, round, and reactive to light.   Cardiovascular:      Rate and Rhythm: Normal rate and regular rhythm. No extrasystoles are present.     Heart sounds: Normal heart sounds. No murmur heard.  Pulmonary:      Effort: Pulmonary effort is normal.      Breath sounds: Normal breath sounds. No decreased breath sounds, wheezing or rhonchi.   Abdominal:      Palpations: Abdomen is soft. There is no mass.      Tenderness: There is no abdominal tenderness. There is no right CVA tenderness, left CVA tenderness, guarding or rebound.   Musculoskeletal: "         General: Normal range of motion.      Cervical back: Normal range of motion and neck supple.      Right lower leg: No edema.      Left lower leg: No edema.   Skin:     Findings: No rash.   Neurological:      General: No focal deficit present.      Mental Status: She is alert and oriented to person, place, and time.      Sensory: Sensation is intact.      Motor: Motor function is intact.      Coordination: Coordination is intact.      Gait: Gait is intact.      Deep Tendon Reflexes: Reflexes are normal and symmetric.   Psychiatric:         Attention and Perception: Attention and perception normal.         Mood and Affect: Mood normal.         Speech: Speech normal.         Behavior: Behavior normal. Behavior is cooperative.         Thought Content: Thought content normal.        Result Review :                Assessment and Plan   Diagnoses and all orders for this visit:    1. Gastroesophageal reflux disease without esophagitis (Primary)  Assessment & Plan:  Will try her on Pantoprazole.    Orders:  -     pantoprazole (PROTONIX) 40 MG EC tablet; Take 1 tablet by mouth Daily for 30 days.  Dispense: 30 tablet; Refill: 0    2. Cigarette nicotine dependence without complication  Assessment & Plan:  Tobacco use is unchanged.  Smoking cessation counseling was provided.  Tobacco use will be reassessed in 6 months.                                                   I spent 15 minutes caring for Radha on this date of service. This time includes time spent by me in the following activities:preparing for the visit, performing a medically appropriate examination and/or evaluation , counseling and educating the patient/family/caregiver, ordering medications, tests, or procedures, and documenting information in the medical record  Follow Up   No follow-ups on file.  Patient was given instructions and counseling regarding her condition or for health maintenance advice. Please see specific information pulled into the AVS if  appropriate.     The patient is advised to continue all of her regular medications as prescribed. She was counseled regarding the importance of diet, exercise and medication compliance.      This document has been electronically signed by Neil Tate MD  November 14, 2024 11:06 EST

## 2024-10-18 ENCOUNTER — OFFICE VISIT (OUTPATIENT)
Dept: FAMILY MEDICINE CLINIC | Facility: CLINIC | Age: 69
End: 2024-10-18
Payer: MEDICARE

## 2024-10-18 VITALS
BODY MASS INDEX: 19.69 KG/M2 | HEART RATE: 82 BPM | DIASTOLIC BLOOD PRESSURE: 85 MMHG | HEIGHT: 62 IN | OXYGEN SATURATION: 95 % | RESPIRATION RATE: 18 BRPM | SYSTOLIC BLOOD PRESSURE: 120 MMHG | WEIGHT: 107 LBS | TEMPERATURE: 97.3 F

## 2024-10-18 DIAGNOSIS — M79.601 PAIN OF RIGHT UPPER EXTREMITY: Primary | ICD-10-CM
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
## 2024-10-18 RX ORDER — DEXAMETHASONE SODIUM PHOSPHATE 4 MG/ML
8 INJECTION, SOLUTION INTRA-ARTICULAR; INTRALESIONAL; INTRAMUSCULAR; INTRAVENOUS; SOFT TISSUE ONCE
Status: COMPLETED | OUTPATIENT
Start: 2024-10-18 | End: 2024-10-18

## 2024-10-18 RX ORDER — KETOROLAC TROMETHAMINE 30 MG/ML
30 INJECTION, SOLUTION INTRAMUSCULAR; INTRAVENOUS ONCE
Status: COMPLETED | OUTPATIENT
Start: 2024-10-18 | End: 2024-10-18

## 2024-10-18 RX ADMIN — DEXAMETHASONE SODIUM PHOSPHATE 8 MG: 4 INJECTION, SOLUTION INTRA-ARTICULAR; INTRALESIONAL; INTRAMUSCULAR; INTRAVENOUS; SOFT TISSUE at 11:49

## 2024-10-18 RX ADMIN — KETOROLAC TROMETHAMINE 30 MG: 30 INJECTION, SOLUTION INTRAMUSCULAR; INTRAVENOUS at 11:51

## 2024-10-18 NOTE — PROGRESS NOTES
"Chief Complaint  Arm Pain (She fell in 2014 and hurt her right arm not its hurting really bad )    Subjective        Radha Stanton presents to [facility]  C/O CONCERNS ABOUT FALL 2014/ PAIN IN MULTIPLE AREAS SUSTAINED IN INJURY:   Arm Pain   The incident occurred at home. The injury mechanism was a fall. The pain is present in the upper right arm. The pain is moderate. The pain has been Intermittent since the incident. Associated symptoms include numbness and tingling. Pertinent negatives include no chest pain or muscle weakness. She has tried NSAIDs for the symptoms. The treatment provided mild relief.       Objective   Vital Signs:  /85 (BP Location: Left arm, Patient Position: Sitting, Cuff Size: Adult)   Pulse 82   Temp 97.3 °F (36.3 °C) (Temporal)   Resp 18   Ht 157.5 cm (62\")   Wt 48.5 kg (107 lb)   SpO2 95%   BMI 19.57 kg/m²   Estimated body mass index is 19.57 kg/m² as calculated from the following:    Height as of this encounter: 157.5 cm (62\").    Weight as of this encounter: 48.5 kg (107 lb).    BMI is within normal parameters. No other follow-up for BMI required.      Physical Exam  Vitals and nursing note reviewed.   Constitutional:       Appearance: Normal appearance.   HENT:      Head: Normocephalic.      Right Ear: Tympanic membrane normal.      Left Ear: Tympanic membrane normal.      Nose: Nose normal.      Mouth/Throat:      Mouth: Mucous membranes are moist.   Eyes:      Extraocular Movements: Extraocular movements intact.      Pupils: Pupils are equal, round, and reactive to light.   Cardiovascular:      Rate and Rhythm: Normal rate and regular rhythm.      Pulses: Normal pulses.      Heart sounds: Normal heart sounds.   Pulmonary:      Effort: Pulmonary effort is normal.      Breath sounds: Normal breath sounds.   Abdominal:      General: Abdomen is flat. Bowel sounds are normal.      Palpations: Abdomen is soft.   Musculoskeletal:         General: " Normal range of motion.      Cervical back: Normal range of motion and neck supple.   Skin:     General: Skin is warm.      Capillary Refill: Capillary refill takes less than 2 seconds.   Neurological:      General: No focal deficit present.      Mental Status: She is alert and oriented to person, place, and time.   Psychiatric:         Mood and Affect: Mood normal.        Result Review :  The following data was reviewed by: Johnathan Carey MD on 10/18/2024:  Common labs          4/9/2024    11:25 5/21/2024    10:36 7/29/2024    11:21   Common Labs   Glucose 99   104    BUN 12   11    Creatinine 0.79   0.83    Sodium 147   139    Potassium 4.4   4.4    Chloride 109   102    Calcium 10.1   10.2    Albumin 4.1   4.1    Total Bilirubin 0.2   0.2    Alkaline Phosphatase 116   123    AST (SGOT) 13   14    ALT (SGPT) 11   8    WBC 9.69   11.23    Hemoglobin 13.7   14.0    Hematocrit 44.6   44.1    Platelets 284   305    Total Cholesterol 175   174    Triglycerides 60   79    HDL Cholesterol 67   61    LDL Cholesterol  96   98    Hemoglobin A1C 5.60   6.00    Microalbumin, Urine  <1.2       Data reviewed : Radiologic studies 2/72/4 DEXA           Assessment and Plan   Diagnoses and all orders for this visit:    1. Pain of right upper extremity (Primary)  -     ketorolac (TORADOL) injection 30 mg  -     dexAMETHasone (DECADRON) injection 8 mg    2. Fall, sequela  -     ketorolac (TORADOL) injection 30 mg  -     dexAMETHasone (DECADRON) injection 8 mg           I spent 30 minutes caring for Radha on this date of service. This time includes time spent by me in the following activities:reviewing tests, obtaining and/or reviewing a separately obtained history, performing a medically appropriate examination and/or evaluation , counseling and educating the patient/family/caregiver, ordering medications, tests, or procedures, and documenting information in the medical record     Follow Up   Return if symptoms worsen or fail  to improve.  Patient was given instructions and counseling regarding her condition or for health maintenance advice. Please see specific information pulled into the AVS if appropriate.           This document has been electronically signed by Johnathan Carey MD  October 18, 2024 11:20 EDT

## 2024-12-02 ENCOUNTER — POP HEALTH PHARMACY (OUTPATIENT)
Dept: PHARMACY | Facility: OTHER | Age: 69
End: 2024-12-02
Payer: MEDICARE